# Patient Record
Sex: FEMALE | Race: WHITE | NOT HISPANIC OR LATINO | URBAN - METROPOLITAN AREA
[De-identification: names, ages, dates, MRNs, and addresses within clinical notes are randomized per-mention and may not be internally consistent; named-entity substitution may affect disease eponyms.]

---

## 2017-04-08 ENCOUNTER — OUTPATIENT (OUTPATIENT)
Dept: OUTPATIENT SERVICES | Facility: HOSPITAL | Age: 82
LOS: 1 days | Discharge: HOME | End: 2017-04-08

## 2017-06-27 DIAGNOSIS — D50.8 OTHER IRON DEFICIENCY ANEMIAS: ICD-10-CM

## 2017-06-27 DIAGNOSIS — R79.89 OTHER SPECIFIED ABNORMAL FINDINGS OF BLOOD CHEMISTRY: ICD-10-CM

## 2017-06-27 DIAGNOSIS — E55.9 VITAMIN D DEFICIENCY, UNSPECIFIED: ICD-10-CM

## 2017-06-30 ENCOUNTER — OUTPATIENT (OUTPATIENT)
Dept: OUTPATIENT SERVICES | Facility: HOSPITAL | Age: 82
LOS: 1 days | Discharge: HOME | End: 2017-06-30

## 2017-06-30 DIAGNOSIS — S72.143A DISPLACED INTERTROCHANTERIC FRACTURE OF UNSPECIFIED FEMUR, INITIAL ENCOUNTER FOR CLOSED FRACTURE: ICD-10-CM

## 2017-06-30 DIAGNOSIS — I10 ESSENTIAL (PRIMARY) HYPERTENSION: ICD-10-CM

## 2017-06-30 DIAGNOSIS — E78.5 HYPERLIPIDEMIA, UNSPECIFIED: ICD-10-CM

## 2017-10-24 ENCOUNTER — OUTPATIENT (OUTPATIENT)
Dept: OUTPATIENT SERVICES | Facility: HOSPITAL | Age: 82
LOS: 1 days | Discharge: HOME | End: 2017-10-24

## 2017-10-24 DIAGNOSIS — S72.143A DISPLACED INTERTROCHANTERIC FRACTURE OF UNSPECIFIED FEMUR, INITIAL ENCOUNTER FOR CLOSED FRACTURE: ICD-10-CM

## 2017-10-24 DIAGNOSIS — R79.89 OTHER SPECIFIED ABNORMAL FINDINGS OF BLOOD CHEMISTRY: ICD-10-CM

## 2017-11-01 DIAGNOSIS — E55.9 VITAMIN D DEFICIENCY, UNSPECIFIED: ICD-10-CM

## 2017-12-28 ENCOUNTER — OUTPATIENT (OUTPATIENT)
Dept: OUTPATIENT SERVICES | Facility: HOSPITAL | Age: 82
LOS: 1 days | Discharge: HOME | End: 2017-12-28

## 2017-12-28 DIAGNOSIS — M33.20 POLYMYOSITIS, ORGAN INVOLVEMENT UNSPECIFIED: ICD-10-CM

## 2017-12-28 DIAGNOSIS — D50.8 OTHER IRON DEFICIENCY ANEMIAS: ICD-10-CM

## 2017-12-28 DIAGNOSIS — M06.4 INFLAMMATORY POLYARTHROPATHY: ICD-10-CM

## 2017-12-28 DIAGNOSIS — R79.89 OTHER SPECIFIED ABNORMAL FINDINGS OF BLOOD CHEMISTRY: ICD-10-CM

## 2017-12-28 DIAGNOSIS — S72.143A DISPLACED INTERTROCHANTERIC FRACTURE OF UNSPECIFIED FEMUR, INITIAL ENCOUNTER FOR CLOSED FRACTURE: ICD-10-CM

## 2017-12-28 DIAGNOSIS — E55.9 VITAMIN D DEFICIENCY, UNSPECIFIED: ICD-10-CM

## 2018-03-05 ENCOUNTER — OUTPATIENT (OUTPATIENT)
Dept: OUTPATIENT SERVICES | Facility: HOSPITAL | Age: 83
LOS: 1 days | Discharge: HOME | End: 2018-03-05

## 2018-03-05 DIAGNOSIS — E03.9 HYPOTHYROIDISM, UNSPECIFIED: ICD-10-CM

## 2018-03-05 DIAGNOSIS — E55.9 VITAMIN D DEFICIENCY, UNSPECIFIED: ICD-10-CM

## 2018-03-05 DIAGNOSIS — E53.8 DEFICIENCY OF OTHER SPECIFIED B GROUP VITAMINS: ICD-10-CM

## 2018-03-05 DIAGNOSIS — E78.2 MIXED HYPERLIPIDEMIA: ICD-10-CM

## 2018-03-05 DIAGNOSIS — D50.9 IRON DEFICIENCY ANEMIA, UNSPECIFIED: ICD-10-CM

## 2018-04-17 ENCOUNTER — OUTPATIENT (OUTPATIENT)
Dept: OUTPATIENT SERVICES | Facility: HOSPITAL | Age: 83
LOS: 1 days | Discharge: HOME | End: 2018-04-17

## 2018-04-17 DIAGNOSIS — R79.89 OTHER SPECIFIED ABNORMAL FINDINGS OF BLOOD CHEMISTRY: ICD-10-CM

## 2018-04-17 DIAGNOSIS — R70.0 ELEVATED ERYTHROCYTE SEDIMENTATION RATE: ICD-10-CM

## 2018-04-17 DIAGNOSIS — M05.419: ICD-10-CM

## 2018-05-09 ENCOUNTER — OUTPATIENT (OUTPATIENT)
Dept: OUTPATIENT SERVICES | Facility: HOSPITAL | Age: 83
LOS: 1 days | Discharge: HOME | End: 2018-05-09

## 2018-05-09 DIAGNOSIS — E55.9 VITAMIN D DEFICIENCY, UNSPECIFIED: ICD-10-CM

## 2018-05-09 DIAGNOSIS — D50.8 OTHER IRON DEFICIENCY ANEMIAS: ICD-10-CM

## 2018-05-09 DIAGNOSIS — R79.89 OTHER SPECIFIED ABNORMAL FINDINGS OF BLOOD CHEMISTRY: ICD-10-CM

## 2018-05-09 DIAGNOSIS — M06.4 INFLAMMATORY POLYARTHROPATHY: ICD-10-CM

## 2018-06-07 ENCOUNTER — OUTPATIENT (OUTPATIENT)
Dept: OUTPATIENT SERVICES | Facility: HOSPITAL | Age: 83
LOS: 1 days | Discharge: HOME | End: 2018-06-07

## 2018-06-07 DIAGNOSIS — R26.81 UNSTEADINESS ON FEET: ICD-10-CM

## 2018-08-14 ENCOUNTER — OUTPATIENT (OUTPATIENT)
Dept: OUTPATIENT SERVICES | Facility: HOSPITAL | Age: 83
LOS: 1 days | Discharge: HOME | End: 2018-08-14

## 2018-08-15 DIAGNOSIS — Z13.820 ENCOUNTER FOR SCREENING FOR OSTEOPOROSIS: ICD-10-CM

## 2018-08-15 DIAGNOSIS — M89.9 DISORDER OF BONE, UNSPECIFIED: ICD-10-CM

## 2018-08-15 DIAGNOSIS — Z87.310 PERSONAL HISTORY OF (HEALED) OSTEOPOROSIS FRACTURE: ICD-10-CM

## 2018-08-15 DIAGNOSIS — Z78.0 ASYMPTOMATIC MENOPAUSAL STATE: ICD-10-CM

## 2018-10-19 ENCOUNTER — OUTPATIENT (OUTPATIENT)
Dept: OUTPATIENT SERVICES | Facility: HOSPITAL | Age: 83
LOS: 1 days | Discharge: HOME | End: 2018-10-19

## 2018-10-19 DIAGNOSIS — M06.4 INFLAMMATORY POLYARTHROPATHY: ICD-10-CM

## 2018-10-19 DIAGNOSIS — D50.8 OTHER IRON DEFICIENCY ANEMIAS: ICD-10-CM

## 2018-10-19 DIAGNOSIS — E55.9 VITAMIN D DEFICIENCY, UNSPECIFIED: ICD-10-CM

## 2018-10-19 DIAGNOSIS — R79.89 OTHER SPECIFIED ABNORMAL FINDINGS OF BLOOD CHEMISTRY: ICD-10-CM

## 2019-02-09 ENCOUNTER — APPOINTMENT (OUTPATIENT)
Dept: GERIATRICS | Facility: HOME HEALTH | Age: 84
End: 2019-02-09

## 2019-02-09 DIAGNOSIS — Z91.81 HISTORY OF FALLING: ICD-10-CM

## 2019-02-09 PROBLEM — Z00.00 ENCOUNTER FOR PREVENTIVE HEALTH EXAMINATION: Status: ACTIVE | Noted: 2019-02-09

## 2019-02-09 NOTE — PHYSICAL EXAM
[General Appearance - Alert] : alert [General Appearance - In No Acute Distress] : in no acute distress [Sclera] : the sclera and conjunctiva were normal [PERRL With Normal Accommodation] : pupils were equal in size, round, and reactive to light [Extraocular Movements] : extraocular movements were intact [Outer Ear] : the ears and nose were normal in appearance [Oropharynx] : the oropharynx was normal [Neck Appearance] : the appearance of the neck was normal [Neck Cervical Mass (___cm)] : no neck mass was observed [Jugular Venous Distention Increased] : there was no jugular-venous distention [Thyroid Diffuse Enlargement] : the thyroid was not enlarged [Thyroid Nodule] : there were no palpable thyroid nodules [Auscultation Breath Sounds / Voice Sounds] : lungs were clear to auscultation bilaterally [Heart Rate And Rhythm] : heart rate was normal and rhythm regular [Heart Sounds] : normal S1 and S2 [Heart Sounds Gallop] : no gallops [Murmurs] : no murmurs [Heart Sounds Pericardial Friction Rub] : no pericardial rub [Full Pulse] : the pedal pulses are present [Edema] : there was no peripheral edema [Breast Appearance] : normal in appearance [Breast Palpation Mass] : no palpable masses [Bowel Sounds] : normal bowel sounds [Abdomen Soft] : soft [Abdomen Tenderness] : non-tender [Abdomen Mass (___ Cm)] : no abdominal mass palpated [Cervical Lymph Nodes Enlarged Posterior Bilaterally] : posterior cervical [Cervical Lymph Nodes Enlarged Anterior Bilaterally] : anterior cervical [Supraclavicular Lymph Nodes Enlarged Bilaterally] : supraclavicular [Axillary Lymph Nodes Enlarged Bilaterally] : axillary [Femoral Lymph Nodes Enlarged Bilaterally] : femoral [Inguinal Lymph Nodes Enlarged Bilaterally] : inguinal [No CVA Tenderness] : no ~M costovertebral angle tenderness [No Spinal Tenderness] : no spinal tenderness [Ataxic] : ataxic [Skin Color & Pigmentation] : normal skin color and pigmentation [Skin Turgor] : normal skin turgor [] : no rash [Deep Tendon Reflexes (DTR)] : deep tendon reflexes were 2+ and symmetric [Sensation] : the sensory exam was normal to light touch and pinprick [No Focal Deficits] : no focal deficits

## 2019-02-09 NOTE — HISTORY OF PRESENT ILLNESS
[FreeTextEntry1] : pt has recovery of her swollen ankles. C/O constipation at times [0] : 2) Feeling down, depressed, or hopeless: Not at all

## 2019-08-23 ENCOUNTER — APPOINTMENT (OUTPATIENT)
Dept: GERIATRICS | Facility: HOME HEALTH | Age: 84
End: 2019-08-23
Payer: MEDICARE

## 2019-08-23 PROCEDURE — 99348 HOME/RES VST EST LOW MDM 30: CPT

## 2019-08-28 VITALS
HEART RATE: 60 BPM | TEMPERATURE: 98.2 F | OXYGEN SATURATION: 98 % | RESPIRATION RATE: 18 BRPM | SYSTOLIC BLOOD PRESSURE: 128 MMHG | DIASTOLIC BLOOD PRESSURE: 82 MMHG

## 2019-08-28 RX ORDER — METOPROLOL TARTRATE 50 MG/1
50 TABLET, FILM COATED ORAL
Refills: 0 | Status: ACTIVE | COMMUNITY

## 2019-08-28 NOTE — PHYSICAL EXAM
[General Appearance - Alert] : alert [General Appearance - In No Acute Distress] : in no acute distress [General Appearance - Well Nourished] : well nourished [General Appearance - Well Developed] : well developed [Sclera] : the sclera and conjunctiva were normal [Normal Oral Mucosa] : normal oral mucosa [No Oral Pallor] : no oral pallor [No Oral Cyanosis] : no oral cyanosis [Outer Ear] : the ears and nose were normal in appearance [Hearing Threshold Finger Rub Not [place]] : hearing was normal [Examination Of The Oral Cavity] : the lips and gums were normal [Neck Cervical Mass (___cm)] : no neck mass was observed [Jugular Venous Distention Increased] : there was no jugular-venous distention [Respiration, Rhythm And Depth] : normal respiratory rhythm and effort [Exaggerated Use Of Accessory Muscles For Inspiration] : no accessory muscle use [Auscultation Breath Sounds / Voice Sounds] : lungs were clear to auscultation bilaterally [Heart Rate And Rhythm] : heart rate was normal and rhythm regular [Heart Sounds] : normal S1 and S2 [Edema] : there was no peripheral edema [Abdomen Tenderness] : non-tender [Abdomen Soft] : soft [Nail Clubbing] : no clubbing  or cyanosis of the fingernails [No Spinal Tenderness] : no spinal tenderness [Involuntary Movements] : no involuntary movements were seen [] : no rash [Skin Color & Pigmentation] : normal skin color and pigmentation [Cranial Nerves] : cranial nerves 2-12 were intact [No Focal Deficits] : no focal deficits [Impaired Insight] : insight and judgment were intact [Oriented To Time, Place, And Person] : oriented to person, place, and time

## 2019-08-28 NOTE — HISTORY OF PRESENT ILLNESS
[FreeTextEntry1] : Patient seen and examined at home.  Patient is homebound.  Patient's friend Santy present during visit.  Overall patient doing well.  no complaints of CP/SOB.  No abdominal complaints with good appetite and regular BM's.  No urianry complaints.  Patient with Hx of RA, following with Rheum, Dr. Castano, not currently on any medications.  Was going to outpatient PT but has not gone in several months.  No recent falls.  \par \par - Patient lives alone but her friend spends a lot of time with her and sleeps over most nights.  no HHA.  - Family involved and checks on patient frequently\par - Patient uses a walker for ambulation

## 2019-08-28 NOTE — ASSESSMENT
[FreeTextEntry1] : HTN\par - b/p acceptable at visit\par - c/w current medications\par - check routine labs\par - low Na+ diet\par \par RA/Gait instability\par - DME for ambulation\par - Outpatient FU with Rheumatology \par - Not currently on any medications, using APAP PRN for pain\par - PT/OT evaluation\par - check routine labs\par \par Vitamin D Deficiency\par - c/w PO Supplement \par - check levels\par \par Overall patient doing well.  Has adequate social support and services in place.  Will perform plan as outlined above.  Advised to call w/changes in status.

## 2019-08-29 ENCOUNTER — OUTPATIENT (OUTPATIENT)
Dept: OUTPATIENT SERVICES | Facility: HOSPITAL | Age: 84
LOS: 1 days | Discharge: HOME | End: 2019-08-29

## 2019-08-29 DIAGNOSIS — I10 ESSENTIAL (PRIMARY) HYPERTENSION: ICD-10-CM

## 2019-08-31 ENCOUNTER — RESULT REVIEW (OUTPATIENT)
Age: 84
End: 2019-08-31

## 2019-08-31 LAB
25(OH)D3 SERPL-MCNC: 78 NG/ML
ALBUMIN SERPL ELPH-MCNC: 4.1 G/DL
ALP BLD-CCNC: 56 U/L
ALT SERPL-CCNC: 10 U/L
ANION GAP SERPL CALC-SCNC: 13 MMOL/L
AST SERPL-CCNC: 16 U/L
BASOPHILS # BLD AUTO: 0.02 K/UL
BASOPHILS NFR BLD AUTO: 0.4 %
BILIRUB SERPL-MCNC: 0.5 MG/DL
BUN SERPL-MCNC: 17 MG/DL
CALCIUM SERPL-MCNC: 9.5 MG/DL
CHLORIDE SERPL-SCNC: 102 MMOL/L
CHOLEST SERPL-MCNC: 178 MG/DL
CHOLEST/HDLC SERPL: 4.3 RATIO
CO2 SERPL-SCNC: 28 MMOL/L
CREAT SERPL-MCNC: 0.7 MG/DL
EOSINOPHIL # BLD AUTO: 0.11 K/UL
EOSINOPHIL NFR BLD AUTO: 2.3 %
ESTIMATED AVERAGE GLUCOSE: 97 MG/DL
FOLATE SERPL-MCNC: 12.1 NG/ML
GLUCOSE SERPL-MCNC: 80 MG/DL
HBA1C MFR BLD HPLC: 5 %
HCT VFR BLD CALC: 37.1 %
HDLC SERPL-MCNC: 41 MG/DL
HGB BLD-MCNC: 12.3 G/DL
IMM GRANULOCYTES NFR BLD AUTO: 0.2 %
LDLC SERPL CALC-MCNC: 129 MG/DL
LYMPHOCYTES # BLD AUTO: 1.92 K/UL
LYMPHOCYTES NFR BLD AUTO: 39.7 %
MAN DIFF?: NORMAL
MCHC RBC-ENTMCNC: 30.8 PG
MCHC RBC-ENTMCNC: 33.2 G/DL
MCV RBC AUTO: 93 FL
MONOCYTES # BLD AUTO: 0.37 K/UL
MONOCYTES NFR BLD AUTO: 7.6 %
NEUTROPHILS # BLD AUTO: 2.41 K/UL
NEUTROPHILS NFR BLD AUTO: 49.8 %
PLATELET # BLD AUTO: 154 K/UL
POTASSIUM SERPL-SCNC: 3.5 MMOL/L
PROT SERPL-MCNC: 6.9 G/DL
RBC # BLD: 3.99 M/UL
RBC # FLD: 12.6 %
SODIUM SERPL-SCNC: 143 MMOL/L
T3FREE SERPL-MCNC: 2.65 PG/ML
T4 FREE SERPL-MCNC: 0.9 NG/DL
TRIGL SERPL-MCNC: 144 MG/DL
TSH SERPL-ACNC: 5.11 UIU/ML
VIT B12 SERPL-MCNC: 542 PG/ML
WBC # FLD AUTO: 4.84 K/UL

## 2019-09-26 ENCOUNTER — OUTPATIENT (OUTPATIENT)
Dept: OUTPATIENT SERVICES | Facility: HOSPITAL | Age: 84
LOS: 1 days | Discharge: HOME | End: 2019-09-26

## 2019-09-26 DIAGNOSIS — E78.5 HYPERLIPIDEMIA, UNSPECIFIED: ICD-10-CM

## 2019-09-26 DIAGNOSIS — I10 ESSENTIAL (PRIMARY) HYPERTENSION: ICD-10-CM

## 2019-10-11 ENCOUNTER — LABORATORY RESULT (OUTPATIENT)
Age: 84
End: 2019-10-11

## 2019-10-11 ENCOUNTER — OUTPATIENT (OUTPATIENT)
Dept: OUTPATIENT SERVICES | Facility: HOSPITAL | Age: 84
LOS: 1 days | Discharge: HOME | End: 2019-10-11

## 2019-10-11 DIAGNOSIS — R79.89 OTHER SPECIFIED ABNORMAL FINDINGS OF BLOOD CHEMISTRY: ICD-10-CM

## 2019-10-11 DIAGNOSIS — N39.0 URINARY TRACT INFECTION, SITE NOT SPECIFIED: ICD-10-CM

## 2019-10-14 ENCOUNTER — APPOINTMENT (OUTPATIENT)
Dept: GERIATRICS | Facility: HOME HEALTH | Age: 84
End: 2019-10-14
Payer: MEDICARE

## 2019-10-14 PROCEDURE — 99348 HOME/RES VST EST LOW MDM 30: CPT

## 2019-10-15 NOTE — REVIEW OF SYSTEMS
[Chills] : chills [Feeling Poorly] : feeling poorly [Abdominal Pain] : abdominal pain [Vomiting] : vomiting [Heartburn] : heartburn [Incontinence] : incontinence [Arthralgias] : arthralgias [Joint Pain] : joint pain [Joint Stiffness] : joint stiffness [Anxiety] : anxiety [Negative] : Respiratory

## 2019-10-15 NOTE — HISTORY OF PRESENT ILLNESS
[FreeTextEntry1] : Pt is  homebound due to gait instability. Pt states to have nausea w throwing up the clear vomitus of food contents,wout any blood or mucus. Also  c/o upper abdominal discomfort ,Denies F/Chills/Diarrhea /constipation. H/O sick contact is positive, Also c/o frequent ,dark yellowish urine jonh during night

## 2019-10-15 NOTE — PHYSICAL EXAM
[Sclera] : the sclera and conjunctiva were normal [PERRL With Normal Accommodation] : pupils were equal in size, round, and reactive to light [Extraocular Movements] : extraocular movements were intact [Outer Ear] : the ears and nose were normal in appearance [Oropharynx] : the oropharynx was normal [Auscultation Breath Sounds / Voice Sounds] : lungs were clear to auscultation bilaterally [Heart Rate And Rhythm] : heart rate was normal and rhythm regular [Heart Sounds] : normal S1 and S2 [Heart Sounds Gallop] : no gallops [Murmurs] : no murmurs [Heart Sounds Pericardial Friction Rub] : no pericardial rub [] : no hepato-splenomegaly [Abdomen Mass (___ Cm)] : no abdominal mass palpated [Abdomen Hernia] : no hernia was discovered [FreeTextEntry1] : dull mild tenderness in epigastrium, tympanic BS [No Focal Deficits] : no focal deficits [Oriented To Time, Place, And Person] : oriented to person, place, and time

## 2019-11-18 ENCOUNTER — APPOINTMENT (OUTPATIENT)
Dept: GERIATRICS | Facility: HOME HEALTH | Age: 84
End: 2019-11-18
Payer: MEDICARE

## 2019-11-18 PROCEDURE — 99348 HOME/RES VST EST LOW MDM 30: CPT

## 2019-11-26 NOTE — PHYSICAL EXAM
[FreeTextEntry1] : pt is d concerned for her US results [Sclera] : the sclera and conjunctiva were normal [Outer Ear] : the ears and nose were normal in appearance [PERRL With Normal Accommodation] : pupils were equal in size, round, and reactive to light [Extraocular Movements] : extraocular movements were intact [Oropharynx] : the oropharynx was normal [Auscultation Breath Sounds / Voice Sounds] : lungs were clear to auscultation bilaterally [Heart Sounds] : normal S1 and S2 [Heart Rate And Rhythm] : heart rate was normal and rhythm regular [Murmurs] : no murmurs [Heart Sounds Gallop] : no gallops [Heart Sounds Pericardial Friction Rub] : no pericardial rub [Bowel Sounds] : normal bowel sounds [Abdomen Soft] : soft [Abdomen Mass (___ Cm)] : no abdominal mass palpated [Abdomen Hernia] : no hernia was discovered [RLQ] : in the right lower quadrant [Suprapubic] : in the suprapubic area [No Spinal Tenderness] : no spinal tenderness [No CVA Tenderness] : no ~M costovertebral angle tenderness [Ataxic] : ataxic [Skin Color & Pigmentation] : normal skin color and pigmentation [No Focal Deficits] : no focal deficits [] : no rash [Skin Turgor] : normal skin turgor [Oriented To Time, Place, And Person] : oriented to person, place, and time

## 2019-11-26 NOTE — HISTORY OF PRESENT ILLNESS
[FreeTextEntry1] : Pt is homebound due to OA and gait instabilty. pt called for having pain in abdomen and constipation x -2 wks, Denies any h/o F/ V/Nausea/Wt loss. Has h/o intyermiyttent. dull, ache about 2-3 in severity . also has h/o frequent urination jonh at night wout any foul smelling or dark colored urine. US of abd/ pelvis shows a small stone in R ureter and pt has PMH of renal stones wout any serious sequele

## 2019-11-26 NOTE — REVIEW OF SYSTEMS
[Feeling Poorly] : feeling poorly [Incontinence] : incontinence [Abdominal Pain] : abdominal pain [Arthralgias] : arthralgias [Joint Pain] : joint pain [Joint Stiffness] : joint stiffness [Negative] : Integumentary [FreeTextEntry8] : frequency jonh nocturia

## 2020-02-08 ENCOUNTER — APPOINTMENT (OUTPATIENT)
Dept: GERIATRICS | Facility: HOME HEALTH | Age: 85
End: 2020-02-08
Payer: MEDICARE

## 2020-02-08 VITALS
DIASTOLIC BLOOD PRESSURE: 84 MMHG | SYSTOLIC BLOOD PRESSURE: 132 MMHG | RESPIRATION RATE: 16 BRPM | TEMPERATURE: 97.8 F | HEART RATE: 61 BPM | OXYGEN SATURATION: 98 %

## 2020-02-08 PROCEDURE — 99348 HOME/RES VST EST LOW MDM 30: CPT

## 2020-02-08 NOTE — PHYSICAL EXAM
[General Appearance - Well Developed] : well developed [General Appearance - Well Nourished] : well nourished [General Appearance - Alert] : alert [General Appearance - In No Acute Distress] : in no acute distress [Normal Oral Mucosa] : normal oral mucosa [Sclera] : the sclera and conjunctiva were normal [No Oral Cyanosis] : no oral cyanosis [Hearing Threshold Finger Rub Not [place]] : hearing was normal [No Oral Pallor] : no oral pallor [Outer Ear] : the ears and nose were normal in appearance [Jugular Venous Distention Increased] : there was no jugular-venous distention [Examination Of The Oral Cavity] : the lips and gums were normal [Neck Cervical Mass (___cm)] : no neck mass was observed [Respiration, Rhythm And Depth] : normal respiratory rhythm and effort [Exaggerated Use Of Accessory Muscles For Inspiration] : no accessory muscle use [Auscultation Breath Sounds / Voice Sounds] : lungs were clear to auscultation bilaterally [Heart Rate And Rhythm] : heart rate was normal and rhythm regular [Heart Sounds] : normal S1 and S2 [Abdomen Tenderness] : non-tender [Abdomen Soft] : soft [Edema] : there was no peripheral edema [No Spinal Tenderness] : no spinal tenderness [Nail Clubbing] : no clubbing  or cyanosis of the fingernails [Involuntary Movements] : no involuntary movements were seen [] : no rash [Skin Lesions] : no skin lesions [Cranial Nerves] : cranial nerves 2-12 were intact [No Focal Deficits] : no focal deficits [Oriented To Time, Place, And Person] : oriented to person, place, and time [Impaired Insight] : insight and judgment were intact

## 2020-02-08 NOTE — ASSESSMENT
[FreeTextEntry1] : HTN\par - b/p acceptable at visit\par - c/w current medications\par - low Na+ diet\par - following outpt w/Cards (Dr. Montague) Went several weeks ago.  No change to RX\par \par RA/Gait instability\par - DME for ambulation\par - Outpatient FU with Rheumatology  as scheduled (Dr. Sadler)\par - Not currently on any medications, using APAP PRN for pain\par - PT/OT evaluation\par \par Constipation\par - Colace/Senna PRN\par - Miralax QD PRN\par \par Vitamin D Deficiency\par - c/w PO Supplement \par - check levels\par

## 2020-02-08 NOTE — HISTORY OF PRESENT ILLNESS
[FreeTextEntry1] : Patient seen and examined at home.  Patient is homebound 2/2 generalized weakness/debility.  Patient reports doing well.  No acute complaints.  No CP/SOB. No abdominal complaints with good appetite and regular BM's.  No urinary complaints.  No skin wounds.  No recent falls.  Medications reviewed and reconciled.  Patient did report some constipation for which she went to [facility] and given Miralax with good results.  I explained to her that she should call us if she develops acute complaints and I left a business card w/phone number.

## 2020-05-30 ENCOUNTER — APPOINTMENT (OUTPATIENT)
Dept: GERIATRICS | Facility: HOME HEALTH | Age: 85
End: 2020-05-30
Payer: MEDICARE

## 2020-05-30 VITALS
RESPIRATION RATE: 18 BRPM | TEMPERATURE: 98.2 F | DIASTOLIC BLOOD PRESSURE: 82 MMHG | SYSTOLIC BLOOD PRESSURE: 128 MMHG | HEART RATE: 58 BPM | OXYGEN SATURATION: 97 %

## 2020-05-30 DIAGNOSIS — N39.41 URGE INCONTINENCE: ICD-10-CM

## 2020-05-30 PROCEDURE — 99348 HOME/RES VST EST LOW MDM 30: CPT

## 2020-05-30 NOTE — ASSESSMENT
[FreeTextEntry1] : HTN\par - b/p acceptable at visit\par - c/w current medications\par - low Na+ diet\par - following outpt w/Cards (Dr. Montague), recent appt cancelled 2/2 COVID, will reschedule when able\par \par RA/Gait instability\par - DME for ambulation, using cane/walker.  no recent falls\par - Outpatient FU with Rheumatology as scheduled (Dr. Sadler)\par - Not currently on any medications, using APAP PRN for pain\par - PT/OT evaluation again, last time patient reports only received two visits and was cancelled 2/2 COVID.  \par \par Urge Incontinence\par - will try Oxybutynin 5mg PO BID to help with nocturia\par - advised to call in 2 weeks with progress report \par \par Constipation\par - Colace/Senna PRN\par - MiraLAX QD PRN\par \par Vitamin D Deficiency\par - c/w PO Supplement \par \par

## 2020-05-30 NOTE — HISTORY OF PRESENT ILLNESS
[FreeTextEntry1] : Patient seen and examined at home.  Patient is homebound 2/2 generalized weakness/debility.  Friend present during visit.  All report patient doing well.  No acute complaints.  No CP/SOB. No abdominal complaints with good appetite and regular BM's.  No urinary complaints.  Patient with complaints of decreasing ambulation status, no recent falls.  Pain at baseline.  Patient reporting frequent urination, especially at night.  No dysuria/hematuria.  No flank pain.  No fever.  States has had this problem for several years,

## 2020-05-30 NOTE — PHYSICAL EXAM
[General Appearance - Alert] : alert [General Appearance - In No Acute Distress] : in no acute distress [General Appearance - Well Developed] : well developed [General Appearance - Well Nourished] : well nourished [Sclera] : the sclera and conjunctiva were normal [Normal Oral Mucosa] : normal oral mucosa [No Oral Pallor] : no oral pallor [No Oral Cyanosis] : no oral cyanosis [Outer Ear] : the ears and nose were normal in appearance [Hearing Threshold Finger Rub Not [place]] : hearing was normal [Examination Of The Oral Cavity] : the lips and gums were normal [Jugular Venous Distention Increased] : there was no jugular-venous distention [Neck Cervical Mass (___cm)] : no neck mass was observed [Exaggerated Use Of Accessory Muscles For Inspiration] : no accessory muscle use [Respiration, Rhythm And Depth] : normal respiratory rhythm and effort [Auscultation Breath Sounds / Voice Sounds] : lungs were clear to auscultation bilaterally [Heart Sounds] : normal S1 and S2 [Heart Rate And Rhythm] : heart rate was normal and rhythm regular [Edema] : there was no peripheral edema [Abdomen Soft] : soft [Abdomen Tenderness] : non-tender [No Spinal Tenderness] : no spinal tenderness [Nail Clubbing] : no clubbing  or cyanosis of the fingernails [Involuntary Movements] : no involuntary movements were seen [Skin Lesions] : no skin lesions [] : no rash [Cranial Nerves] : cranial nerves 2-12 were intact [No Focal Deficits] : no focal deficits [Oriented To Time, Place, And Person] : oriented to person, place, and time [Impaired Insight] : insight and judgment were intact

## 2020-08-26 ENCOUNTER — EMERGENCY (EMERGENCY)
Facility: HOSPITAL | Age: 85
LOS: 0 days | Discharge: HOME | End: 2020-08-26
Attending: EMERGENCY MEDICINE | Admitting: EMERGENCY MEDICINE
Payer: MEDICARE

## 2020-08-26 VITALS
HEART RATE: 64 BPM | SYSTOLIC BLOOD PRESSURE: 192 MMHG | DIASTOLIC BLOOD PRESSURE: 107 MMHG | RESPIRATION RATE: 18 BRPM | TEMPERATURE: 99 F | OXYGEN SATURATION: 98 %

## 2020-08-26 VITALS
DIASTOLIC BLOOD PRESSURE: 88 MMHG | OXYGEN SATURATION: 98 % | HEART RATE: 62 BPM | RESPIRATION RATE: 18 BRPM | SYSTOLIC BLOOD PRESSURE: 186 MMHG

## 2020-08-26 DIAGNOSIS — I10 ESSENTIAL (PRIMARY) HYPERTENSION: ICD-10-CM

## 2020-08-26 DIAGNOSIS — K62.89 OTHER SPECIFIED DISEASES OF ANUS AND RECTUM: ICD-10-CM

## 2020-08-26 DIAGNOSIS — K59.00 CONSTIPATION, UNSPECIFIED: ICD-10-CM

## 2020-08-26 PROCEDURE — 99283 EMERGENCY DEPT VISIT LOW MDM: CPT

## 2020-08-26 NOTE — ED ADULT NURSE NOTE - CHPI ED NUR SYMPTOMS NEG
no vomiting/no dysuria/no abdominal distension/no blood in stool/no burning urination/no chills/no diarrhea/no fever/no hematuria/no nausea

## 2020-08-26 NOTE — ED PROVIDER NOTE - NSFOLLOWUPINSTRUCTIONS_ED_ALL_ED_FT
Constipation, Adult  Constipation is when a person has fewer bowel movements in a week than normal, has difficulty having a bowel movement, or has stools that are dry, hard, or larger than normal. Constipation may be caused by an underlying condition. It may become worse with age if a person takes certain medicines and does not take in enough fluids.  Follow these instructions at home:  Eating and drinking        Eat foods that have a lot of fiber, such as fresh fruits and vegetables, whole grains, and beans.Limit foods that are high in fat, low in fiber, or overly processed, such as french fries, hamburgers, cookies, candies, and soda.Drink enough fluid to keep your urine clear or pale yellow.General instructions     Exercise regularly or as told by your health care provider.Go to the restroom when you have the urge to go. Do not hold it in.Take over-the-counter and prescription medicines only as told by your health care provider. These include any fiber supplements.Practice pelvic floor retraining exercises, such as deep breathing while relaxing the lower abdomen and pelvic floor relaxation during bowel movements.Watch your condition for any changes.Keep all follow-up visits as told by your health care provider. This is important.Contact a health care provider if:  You have pain that gets worse.You have a fever.You do not have a bowel movement after 4 days.You vomit.You are not hungry.You lose weight.You are bleeding from the anus.You have thin, pencil-like stools.Get help right away if:  You have a fever and your symptoms suddenly get worse.You leak stool or have blood in your stool.Your abdomen is bloated.You have severe pain in your abdomen.You feel dizzy or you faint.This information is not intended to replace advice given to you by your health care provider. Make sure you discuss any questions you have with your health care provider.

## 2020-08-26 NOTE — ED PROVIDER NOTE - CARE PROVIDER_API CALL
Phylicia Nettles  GASTROENTEROLOGY  [address] [place] [address]  Phone: [phone]  Fax: [phone]  Follow Up Time: 1-3 Days

## 2020-08-26 NOTE — ED PROVIDER NOTE - NS ED ROS FT
Review of Systems:  	•	CONSTITUTIONAL - no fever, no diaphoresis, no chills  	•	SKIN - no rash  	•	HEMATOLOGIC - no bleeding, no bruising  	•	EYES - no eye pain, no blurry vision  	•	ENT - no congestion  	•	RESPIRATORY - no shortness of breath, no cough  	•	CARDIAC - no chest pain, no palpitations  	•	GI - no abd pain, no nausea, no vomiting, no diarrhea, + constipation  	•	GENITO-URINARY - no dysuria; no hematuria, no increased urinary frequency  	•	MUSCULOSKELETAL - no joint paint, no swelling, no redness  	•	NEUROLOGIC - no weakness, no headache, no paresthesias, no LOC  	•	PSYCH - no anxiety, no depression  	All other ROS are negative except as documented in HPI.

## 2020-08-26 NOTE — ED PROVIDER NOTE - PROGRESS NOTE DETAILS
Attending Note: 89 y/o F p/w constipation, x3 days of small bowel movements and increased rectal pressure when going to the bathroom. Pt’s son noticed some kind of protruding mass when he examined and brought pt to ED. No vomiting. No ABD pain. No fever. On exam: PT in NAD. Cardiac- S1S2. Lungs- CTAB. Abdomen soft NTND, soft stool in rectal vault, no protruding mass, no rectal prolapse. Extremities- No LE edema. Neuro- grossly intact. Plan: stool impaction/ constipation. Will disimpact.

## 2020-08-26 NOTE — ED ADULT NURSE NOTE - INTERVENTIONS DEFINITIONS
Monitor for mental status changes and reorient to person, place, and time/Physically safe environment: no spills, clutter or unnecessary equipment/Instruct patient to call for assistance/Stretcher in lowest position, wheels locked, appropriate side rails in place/Monitor gait and stability/[place] to call system/Reinforce activity limits and safety measures with patient and family

## 2020-08-26 NOTE — ED PROVIDER NOTE - ATTENDING CONTRIBUTION TO CARE
Attending Note: 89 y/o F p/w constipation, x3 days of small bowel movements and increased rectal pressure when going to the bathroom. Pt’s son noticed some kind of protruding mass when he examined and brought pt to ED. No vomiting. No ABD pain. No fever. On exam: PT in NAD. Cardiac- S1S2. Lungs- CTAB. Abdomen soft NTND, soft stool in rectal vault, no protruding mass, no rectal prolapse. Extremities- No LE edema. Neuro- grossly intact. Plan: stool impaction/ constipation. Will disimpact.

## 2020-08-26 NOTE — ED ADULT NURSE NOTE - OBJECTIVE STATEMENT
pt presents with rectal pain started yesterday after trying to have a bowel movement. pt with hx of chronic constipation, uses milk of magnesia but didn't help this time. last BM was 3 days ago. pt normally have a BM every other day. pt c/o abdominal pain, denies nausea, vomiting, or fever at home. pt denies bleeding in stool or urine

## 2020-08-26 NOTE — ED PROVIDER NOTE - PHYSICAL EXAMINATION
VITAL SIGNS: I have reviewed nursing notes and confirm.  CONSTITUTIONAL: Well-developed; well-nourished; in no acute distress.  SKIN: Skin exam is warm and dry, no acute rash.  HEAD: Normocephalic; atraumatic.  EYES: PERRL, EOM intact; conjunctiva and sclera clear.  ENT: No nasal discharge; airway clear.   NECK: Supple; non tender.  CARD: S1, S2 normal; no murmurs, gallops, or rubs. Regular rate and rhythm.  RESP: Clear to auscultation bilaterally. No wheezes, rales or rhonchi.  ABD: Normal bowel sounds; soft; non-distended; non-tender.   RECTAL: No hemorrhoids noted. +Soft stool in rectal vault. No rectal prolapse or protruding mass.  : : external genitalia WNL, without lesions. mucosa pink and moist. No uterine prolapse. Chaperoned by Rene.   EXT: Normal ROM. No edema.  LYMPH: No acute cervical adenopathy.  NEURO: Alert, oriented. Grossly unremarkable. No focal deficits.  PSYCH: Cooperative, appropriate.

## 2020-08-26 NOTE — ED PROVIDER NOTE - OBJECTIVE STATEMENT
89 yo F with pmhx of HTN presenting with constipation x 3 days associated with rectal pressure and concern for something protruding out of rectal area as per family. Symptoms are mild. No alleviating/aggravating factors. No cp, sob, fever, chills, abdominal pain, nausea, vomiting, diarrhea, back pain, urinary symptoms, headache, dizziness, paresthesias, or weakness.

## 2020-08-26 NOTE — ED PROVIDER NOTE - CLINICAL SUMMARY MEDICAL DECISION MAKING FREE TEXT BOX
evaluate for rectal impaction, stool removed after disimpaciton and patietn felt better, abd remained soft nt. symptoms resolved after procedure. patietn to fu with GI.

## 2020-08-26 NOTE — ED PROVIDER NOTE - PATIENT PORTAL LINK FT
You can access the FollowMyHealth Patient Portal offered by [facility] by registering at the following website: [email or web address]. By joining [facility]’s FollowMyHealth portal, you will also be able to view your health information using other applications (apps) compatible with our system.

## 2021-01-07 ENCOUNTER — INPATIENT (INPATIENT)
Facility: HOSPITAL | Age: 86
LOS: 4 days | Discharge: HOME HEALTH | End: 2021-01-12
Attending: INTERNAL MEDICINE | Admitting: INTERNAL MEDICINE
Payer: MEDICARE

## 2021-01-07 VITALS
TEMPERATURE: 98 F | SYSTOLIC BLOOD PRESSURE: 146 MMHG | WEIGHT: 125 LBS | RESPIRATION RATE: 18 BRPM | OXYGEN SATURATION: 97 % | DIASTOLIC BLOOD PRESSURE: 106 MMHG | HEART RATE: 88 BPM

## 2021-01-07 PROBLEM — I10 ESSENTIAL (PRIMARY) HYPERTENSION: Chronic | Status: ACTIVE | Noted: 2020-08-26

## 2021-01-07 LAB
ALBUMIN SERPL ELPH-MCNC: 3.4 G/DL — LOW (ref 3.5–5.2)
ALP SERPL-CCNC: 134 U/L — HIGH (ref 30–115)
ALT FLD-CCNC: 5 U/L — SIGNIFICANT CHANGE UP (ref 0–41)
ANION GAP SERPL CALC-SCNC: 9 MMOL/L — SIGNIFICANT CHANGE UP (ref 7–14)
APPEARANCE UR: CLEAR — SIGNIFICANT CHANGE UP
APTT BLD: 27.1 SEC — SIGNIFICANT CHANGE UP (ref 27–39.2)
AST SERPL-CCNC: 14 U/L — SIGNIFICANT CHANGE UP (ref 0–41)
BASOPHILS # BLD AUTO: 0.01 K/UL — SIGNIFICANT CHANGE UP (ref 0–0.2)
BASOPHILS NFR BLD AUTO: 0.1 % — SIGNIFICANT CHANGE UP (ref 0–1)
BILIRUB SERPL-MCNC: 0.8 MG/DL — SIGNIFICANT CHANGE UP (ref 0.2–1.2)
BILIRUB UR-MCNC: NEGATIVE — SIGNIFICANT CHANGE UP
BUN SERPL-MCNC: 24 MG/DL — HIGH (ref 10–20)
CALCIUM SERPL-MCNC: 9.3 MG/DL — SIGNIFICANT CHANGE UP (ref 8.5–10.1)
CHLORIDE SERPL-SCNC: 105 MMOL/L — SIGNIFICANT CHANGE UP (ref 98–110)
CO2 SERPL-SCNC: 25 MMOL/L — SIGNIFICANT CHANGE UP (ref 17–32)
COLOR SPEC: COLORLESS — SIGNIFICANT CHANGE UP
CREAT SERPL-MCNC: 1.1 MG/DL — SIGNIFICANT CHANGE UP (ref 0.7–1.5)
DIFF PNL FLD: NEGATIVE — SIGNIFICANT CHANGE UP
EOSINOPHIL # BLD AUTO: 0.06 K/UL — SIGNIFICANT CHANGE UP (ref 0–0.7)
EOSINOPHIL NFR BLD AUTO: 0.7 % — SIGNIFICANT CHANGE UP (ref 0–8)
GLUCOSE SERPL-MCNC: 92 MG/DL — SIGNIFICANT CHANGE UP (ref 70–99)
GLUCOSE UR QL: NEGATIVE — SIGNIFICANT CHANGE UP
HCT VFR BLD CALC: 35.7 % — LOW (ref 37–47)
HGB BLD-MCNC: 11.9 G/DL — LOW (ref 12–16)
IMM GRANULOCYTES NFR BLD AUTO: 0.2 % — SIGNIFICANT CHANGE UP (ref 0.1–0.3)
INR BLD: 1.08 RATIO — SIGNIFICANT CHANGE UP (ref 0.65–1.3)
KETONES UR-MCNC: NEGATIVE — SIGNIFICANT CHANGE UP
LEUKOCYTE ESTERASE UR-ACNC: NEGATIVE — SIGNIFICANT CHANGE UP
LYMPHOCYTES # BLD AUTO: 1.81 K/UL — SIGNIFICANT CHANGE UP (ref 1.2–3.4)
LYMPHOCYTES # BLD AUTO: 20.4 % — LOW (ref 20.5–51.1)
MCHC RBC-ENTMCNC: 30.8 PG — SIGNIFICANT CHANGE UP (ref 27–31)
MCHC RBC-ENTMCNC: 33.3 G/DL — SIGNIFICANT CHANGE UP (ref 32–37)
MCV RBC AUTO: 92.5 FL — SIGNIFICANT CHANGE UP (ref 81–99)
MONOCYTES # BLD AUTO: 0.3 K/UL — SIGNIFICANT CHANGE UP (ref 0.1–0.6)
MONOCYTES NFR BLD AUTO: 3.4 % — SIGNIFICANT CHANGE UP (ref 1.7–9.3)
NEUTROPHILS # BLD AUTO: 6.69 K/UL — HIGH (ref 1.4–6.5)
NEUTROPHILS NFR BLD AUTO: 75.2 % — SIGNIFICANT CHANGE UP (ref 42.2–75.2)
NITRITE UR-MCNC: NEGATIVE — SIGNIFICANT CHANGE UP
NRBC # BLD: 0 /100 WBCS — SIGNIFICANT CHANGE UP (ref 0–0)
PH UR: 6.5 — SIGNIFICANT CHANGE UP (ref 5–8)
PLATELET # BLD AUTO: 226 K/UL — SIGNIFICANT CHANGE UP (ref 130–400)
POTASSIUM SERPL-MCNC: 4.3 MMOL/L — SIGNIFICANT CHANGE UP (ref 3.5–5)
POTASSIUM SERPL-SCNC: 4.3 MMOL/L — SIGNIFICANT CHANGE UP (ref 3.5–5)
PROT SERPL-MCNC: 6.7 G/DL — SIGNIFICANT CHANGE UP (ref 6–8)
PROT UR-MCNC: NEGATIVE — SIGNIFICANT CHANGE UP
PROTHROM AB SERPL-ACNC: 12.4 SEC — SIGNIFICANT CHANGE UP (ref 9.95–12.87)
RAPID RVP RESULT: SIGNIFICANT CHANGE UP
RBC # BLD: 3.86 M/UL — LOW (ref 4.2–5.4)
RBC # FLD: 13.2 % — SIGNIFICANT CHANGE UP (ref 11.5–14.5)
SARS-COV-2 RNA SPEC QL NAA+PROBE: SIGNIFICANT CHANGE UP
SODIUM SERPL-SCNC: 139 MMOL/L — SIGNIFICANT CHANGE UP (ref 135–146)
SP GR SPEC: 1 — LOW (ref 1.01–1.03)
UROBILINOGEN FLD QL: SIGNIFICANT CHANGE UP
WBC # BLD: 8.89 K/UL — SIGNIFICANT CHANGE UP (ref 4.8–10.8)
WBC # FLD AUTO: 8.89 K/UL — SIGNIFICANT CHANGE UP (ref 4.8–10.8)

## 2021-01-07 PROCEDURE — 93010 ELECTROCARDIOGRAM REPORT: CPT | Mod: 77

## 2021-01-07 PROCEDURE — 99285 EMERGENCY DEPT VISIT HI MDM: CPT | Mod: GC

## 2021-01-07 PROCEDURE — 71045 X-RAY EXAM CHEST 1 VIEW: CPT | Mod: 26

## 2021-01-07 PROCEDURE — 93010 ELECTROCARDIOGRAM REPORT: CPT

## 2021-01-07 RX ORDER — OXYBUTYNIN CHLORIDE 5 MG
5 TABLET ORAL
Refills: 0 | Status: DISCONTINUED | OUTPATIENT
Start: 2021-01-07 | End: 2021-01-12

## 2021-01-07 RX ORDER — LISINOPRIL 2.5 MG/1
40 TABLET ORAL DAILY
Refills: 0 | Status: DISCONTINUED | OUTPATIENT
Start: 2021-01-07 | End: 2021-01-11

## 2021-01-07 RX ORDER — DIPHENHYDRAMINE HCL 50 MG
25 CAPSULE ORAL EVERY 8 HOURS
Refills: 0 | Status: DISCONTINUED | OUTPATIENT
Start: 2021-01-07 | End: 2021-01-08

## 2021-01-07 RX ORDER — ENOXAPARIN SODIUM 100 MG/ML
16 INJECTION SUBCUTANEOUS ONCE
Refills: 0 | Status: DISCONTINUED | OUTPATIENT
Start: 2021-01-07 | End: 2021-01-07

## 2021-01-07 RX ORDER — CHLORHEXIDINE GLUCONATE 213 G/1000ML
1 SOLUTION TOPICAL
Refills: 0 | Status: DISCONTINUED | OUTPATIENT
Start: 2021-01-07 | End: 2021-01-12

## 2021-01-07 RX ORDER — METOPROLOL TARTRATE 50 MG
50 TABLET ORAL
Refills: 0 | Status: DISCONTINUED | OUTPATIENT
Start: 2021-01-07 | End: 2021-01-08

## 2021-01-07 RX ORDER — METOPROLOL TARTRATE 50 MG
5 TABLET ORAL ONCE
Refills: 0 | Status: DISCONTINUED | OUTPATIENT
Start: 2021-01-07 | End: 2021-01-07

## 2021-01-07 RX ORDER — ENOXAPARIN SODIUM 100 MG/ML
15 INJECTION SUBCUTANEOUS ONCE
Refills: 0 | Status: COMPLETED | OUTPATIENT
Start: 2021-01-07 | End: 2021-01-07

## 2021-01-07 RX ORDER — SODIUM CHLORIDE 9 MG/ML
1000 INJECTION, SOLUTION INTRAVENOUS ONCE
Refills: 0 | Status: COMPLETED | OUTPATIENT
Start: 2021-01-07 | End: 2021-01-07

## 2021-01-07 RX ORDER — OXYBUTYNIN CHLORIDE 5 MG
1 TABLET ORAL
Qty: 0 | Refills: 0 | DISCHARGE

## 2021-01-07 RX ORDER — ENOXAPARIN SODIUM 100 MG/ML
40 INJECTION SUBCUTANEOUS ONCE
Refills: 0 | Status: COMPLETED | OUTPATIENT
Start: 2021-01-07 | End: 2021-01-07

## 2021-01-07 RX ORDER — ENOXAPARIN SODIUM 100 MG/ML
56 INJECTION SUBCUTANEOUS EVERY 12 HOURS
Refills: 0 | Status: DISCONTINUED | OUTPATIENT
Start: 2021-01-08 | End: 2021-01-08

## 2021-01-07 RX ORDER — DEXAMETHASONE 0.5 MG/5ML
6 ELIXIR ORAL ONCE
Refills: 0 | Status: COMPLETED | OUTPATIENT
Start: 2021-01-07 | End: 2021-01-07

## 2021-01-07 RX ADMIN — Medication 6 MILLIGRAM(S): at 12:34

## 2021-01-07 RX ADMIN — SODIUM CHLORIDE 1000 MILLILITER(S): 9 INJECTION, SOLUTION INTRAVENOUS at 12:33

## 2021-01-07 RX ADMIN — Medication 50 MILLIGRAM(S): at 19:00

## 2021-01-07 RX ADMIN — SODIUM CHLORIDE 1000 MILLILITER(S): 9 INJECTION, SOLUTION INTRAVENOUS at 13:30

## 2021-01-07 RX ADMIN — ENOXAPARIN SODIUM 40 MILLIGRAM(S): 100 INJECTION SUBCUTANEOUS at 16:43

## 2021-01-07 NOTE — ED PROVIDER NOTE - UNABLE TO OBTAIN
I am unable to obtain a comprehensive history, review of systems, past medical history due to constraints imposed by the urgency of the patient's clinical condition and/or mental status. Dementia

## 2021-01-07 NOTE — ED PROVIDER NOTE - ATTENDING CONTRIBUTION TO CARE
89 y/o female h/o HTN, dementia, denies PSH, lives at home with friend who is also in 90s, no other assistance, now presents with (1) unable to care for self at home and (2) pruritic rash to body x months, worse over approx 1 week, no other household members with similar complaints, denies new medications, environmental exposures, recent travel, denies other complaints at present. Above confirmed with son who lives in [place].    Old chart reviewed.  I have reviewed and agree with the nursing note, except as documented in my note.    VSS, awake, alert, non-toxic appearing, lying comfortably in stretcher, in NAD, ears clear, oropharynx clear, mmm, no JVD or bruit, maculopapular rash on trunk and extermities, chest CTAB, non-labored breathing, no w/r/r, +S1/S2, RRR, no m/r/g, abdomen soft, NT, ND, +BS, no peripheral edema or deformities, alert, clear speech. 89 y/o female h/o HTN, dementia, denies PSH, lives at home with friend who is also in 90s, no other assistance, now presents with (1) unable to care for self at home and (2) pruritic rash to body x months, worse over approx 1 week, no other household members with similar complaints, denies new medications, environmental exposures, recent travel, denies other complaints at present. Above confirmed with son who lives in [place].    Old chart reviewed.  I have reviewed and agree with the nursing note, except as documented in my note.    VSS, awake, alert, non-toxic appearing, lying comfortably in stretcher, in NAD, ears clear, oropharynx clear, mmm, no JVD or bruit, maculopapular rash on trunk and extremities, chest CTAB, non-labored breathing, no w/r/r, +S1/S2, irreg irreg rhythm, no m/r/g, abdomen soft, NT, ND, +BS, no peripheral edema or deformities, alert, clear speech.

## 2021-01-07 NOTE — PROVIDER CONTACT NOTE (OTHER) - BACKGROUND
tele order, this is not a tele unit.
happened pt stated she fell a few weeks ago. RN did not receive any report form ED of previous fall. when RN turned pt for skin assessment there was also a bump on left hip. which was not noted to

## 2021-01-07 NOTE — H&P ADULT - ATTENDING COMMENTS
**HX and physical limited due to pt being poor historian.     89 y/o female h/o HTN, urinary incontinence, dementia (baseline around AAx2), lives at home with friend who is also in 90s, no other assistance, now presents with unable to care for self at home and pruritic rash to body x months, worse over approx 1 week, no other hous **HX and physical limited due to pt being poor historian. Supplemental information obtained from house staff and EMR. The emergency contact answers but seems to be hard of hearing.     89 YO F with a PMH of HTN, urinary incontinence, and dementia who was BIBEMS for eval of diffuse rash for the past x 1 month. Associated with increased urinary frequency. In the ED, Chest X-Ray is read as new left apical lung opacity. EKG shows new-onset atrial fibrillation. Pt started on IV steroids and IVFs (LR).     Physical exam shows pt in NAD. VSS, afebrile, not hypoxic on RA. A&Ox2 (Name/Place). Non-focal neuro exam. Muscle strength/sensation intact. CTA B/L with no W/C/R. Irregular, no M/G/R. ABD is soft and non-tender, normoactive BSs. LEs without swelling. Diffuse maculopapular rash. Labs and radiology as above.    New-onset atrial fibrillation, rate controlled. Tele admit. HD stable. Echo. TSH. A1c and lipids. Serial cardiac enzymes and EKGs. Therapeutic lovenox and transition to PO AC in the AM. CHADsVASC (>2). Cardio consult.    Diffuse maculopapular rash. Derm consult. Benadryl PRN.     Normocytic anemia, unknown baseline. Pt denies bleeding symptoms. Send anemia work-up. Replace as necessary.     Hypoalbuminemia, from poor oral intake. Nutrition eval.     HX of HTN and urinary incontinence. Restart home meds. DVT PPX. Inform PCP of pt's admission to hospital. My note supersedes the residents note.

## 2021-01-07 NOTE — ED PROVIDER NOTE - OBJECTIVE STATEMENT
89 yo F with pmhx of HTN with pruirtic rash urinary frequency for 1 week, worsened than before. pt lives at home with her partner who has been having trouble taking care of her. Denies any fevers chills, no chest pain.

## 2021-01-07 NOTE — H&P ADULT - NSHPSOCIALHISTORY_GEN_ALL_CORE
Marital Status:  (   )    (   ) Single    (   )    ( x )   Lives with: (  ) alone  (  ) children   (  ) spouse   (  ) parents  ( x ) other, friend  Recent Travel: No recent travel    Substance Use (street drugs): ( x ) never used  (  ) other:  Tobacco Usage:  ( x  ) never smoked   (   ) former smoker   (   ) current smoker  (     ) pack year  Alcohol Usage: None   Baseline mobility: unsure

## 2021-01-07 NOTE — ED ADULT TRIAGE NOTE - CHIEF COMPLAINT QUOTE
BIBA from home, generalized pruritic blotchy rash that started 2 days ago. unknown cause/allergen. Given Benadryl by family with minimal improvement. Hx of dementia, increased drowsiness and confusion. patient baseline mental status at this time as per EMS BIBA from home, generalized pruritic blotchy rash that started 2 days ago. unknown cause/allergen. Given Benadryl by family with minimal improvement. Hx of dementia, increased drowsiness and confusion and 1 episode of urinary incontinence. patient baseline mental status at this time as per EMS

## 2021-01-07 NOTE — ED ADULT NURSE NOTE - NSIMPLEMENTINTERV_GEN_ALL_ED
Implemented All Fall with Harm Risk Interventions:  [place] to call system. Call bell, personal items and telephone within reach. Instruct patient to call for assistance. Room bathroom lighting operational. Non-slip footwear when patient is off stretcher. Physically safe environment: no spills, clutter or unnecessary equipment. Stretcher in lowest position, wheels locked, appropriate side rails in place. Provide visual cue, wrist band, yellow gown, etc. Monitor gait and stability. Monitor for mental status changes and reorient to person, place, and time. Review medications for side effects contributing to fall risk. Reinforce activity limits and safety measures with patient and family. Provide visual clues: red socks.

## 2021-01-07 NOTE — PROVIDER CONTACT NOTE (OTHER) - SITUATION
pt received from ED HR noted to be , no tremor, pt denies CP. as per ED pt had Afib on arrival but VSS was now stable, ED did not document any VS.
pt just received from ED, no orders. pt's HR is elevated at .  EKG shows Afib with RVR. as per ED VSS, but no meds were ever given all in ED at all. pt denies CP. pt now has
pt received from ED, during skin assessment RN noted large purple/yellowish bruise to left side of upper thigh going up and around to left buttocks. pt is noted to be A&Ox1. when RN asked pt what

## 2021-01-07 NOTE — H&P ADULT - NSHPLABSRESULTS_GEN_ALL_CORE
11.9   8.89  )-----------( 226      ( 2021 12:31 )             35.7           139  |  105  |  24<H>  ----------------------------<  92  4.3   |  25  |  1.1    Ca    9.3      2021 12:31    TPro  6.7  /  Alb  3.4<L>  /  TBili  0.8  /  DBili  x   /  AST  14  /  ALT  5   /  AlkPhos  134<H>                Urinalysis Basic - ( 2021 11:44 )    Color: Colorless / Appearance: Clear / S.004 / pH: x  Gluc: x / Ketone: Negative  / Bili: Negative / Urobili: <2 mg/dL   Blood: x / Protein: Negative / Nitrite: Negative   Leuk Esterase: Negative / RBC: x / WBC x   Sq Epi: x / Non Sq Epi: x / Bacteria: x        PT/INR - ( 2021 12:31 )   PT: 12.40 sec;   INR: 1.08 ratio         PTT - ( 2021 12:31 )  PTT:27.1 sec    12 Lead ECG (21 @ 12:02)     Diagnosis Line Atrial flutter with variable A-V block  Voltage criteria for left ventricular hypertrophy  Abnormal ECG       Xray Chest 1 View- PORTABLE-Urgent (21 @ 13:23) >    The right lung not well evaluated due to rotation and overlapping cardiac silhouette.    New left apical opacity.

## 2021-01-07 NOTE — H&P ADULT - NSHPPHYSICALEXAM_GEN_ALL_CORE
PHYSICAL EXAM:  GENERAL: NAD, AAO x 2 (name, time), 89y F  HEAD:  Atraumatic, Normocephalic  EYES: EOMI, conjunctiva and sclera white  NECK: Supple, No JVD  CHEST/LUNG: Clear to auscultation bilaterally; No wheeze; No crackles; No accessory muscles used  HEART: Regular rate and rhythm; No murmurs;   ABDOMEN: Soft, Nontender, Nondistended; Bowel sounds present; No guarding, No organomegaly  EXTREMITIES:  2+ Peripheral Pulses, No cyanosis or edema  NEUROLOGY: non-focal  SKIN: rash over upper extremities

## 2021-01-07 NOTE — PROVIDER CONTACT NOTE (OTHER) - ACTION/TREATMENT ORDERED:
provider to place ordered and order Metoprolol
MD to assess pt at bedside to determine further intervention
provider to put in for EKG

## 2021-01-07 NOTE — H&P ADULT - ASSESSMENT
89 y/o female h/o HTN, urinary incontinence, dementia (baseline around AAx2) presented to ED for pruritic rash , increased urinary frequency and friend unable to take care at home. In ED EKG revealed a flutter with AV block which is new.       #A flutter with AV block new onset  - no past history  - follow cardiac enzymes @8pm, 11 30pm, serial EKGs  - s/p 1 dose of lovenox in ED  - pt on lopressor 50mg BID (as per friend she didnt take this medication today)  - c/w lopressor   - cardiology consult    #Pruritic rash over upper extremities  - erythematous macules, no vesicles on exam, likely dermatitis   - will give bendryl prn   - derm consult    #new left apical opacity  - no leucocytosis, no fever, pt asymptomatic  - repeat CXR as it appears rotated    #Increased urinary freq  - UA negative , benign physical exam  - will not start abx  - patient has urinary incontinence likely functional , c/w oxybutynin 5mg BID    #HTN(high)  - c/w lopressor 50mg bid, ON RAMIPRIL 10MG AT HOME WILL give lisinopril here  - DASH diet    #Social admission  - PT/Rehab consult    #DIET: DASH  #DVTppx:   #GIppx: ot indicated  #Activity:Increase as tolerated  #CODE: FULL  #Disposition: from home   89 y/o female h/o HTN, urinary incontinence, dementia (baseline around AAx2) presented to ED for pruritic rash , increased urinary frequency and friend unable to take care at home. In ED EKG revealed a flutter with AV block which is new.       #A flutter with AV block new onset  - no past history  -  s/p 40mg of  lovenox in ED, 1l LR bolus  - pt on lopressor 50mg BID (as per friend she didnt take this medication today)  - follow cardiac enzymes @8pm, 11 30pm, serial EKGs  - will give extra 16mg of lovenox (weight is 56kg) , will start lovenox 56 BID (therauptic dose)  - fu ECHO, TSH  - c/w lopressor 50 mg BID   - admit to telemetry  - cardiology consult    #Pruritic rash over upper extremities  - erythematous macules, no vesicles on exam, likely dermatitis   - will give bendryl prn   - derm consult    #new left apical opacity  - no leucocytosis, no fever, pt asymptomatic  - repeat CXR as it appears rotated    #Increased urinary freq  - UA negative , benign physical exam  - will not start abx  - patient has urinary incontinence likely functional , c/w oxybutynin 5mg BID    #HTN(high)  - c/w lopressor 50mg bid, ON RAMIPRIL 10MG AT HOME WILL give lisinopril here  - DASH diet    #Social admission  - PT/Rehab consult    #DIET: DASH  #DVTppx: lovenox  #GIppx: ot indicated  #Activity:Increase as tolerated  #CODE: FULL  #Disposition: from home

## 2021-01-07 NOTE — ED ADULT NURSE NOTE - CHIEF COMPLAINT QUOTE
BIBA from home, generalized pruritic blotchy rash that started 2 days ago. unknown cause/allergen. Given Benadryl by family with minimal improvement. Hx of dementia, increased drowsiness and confusion and 1 episode of urinary incontinence. patient baseline mental status at this time as per EMS

## 2021-01-07 NOTE — H&P ADULT - HISTORY OF PRESENT ILLNESS
Patient is a poor historian , spoke to patient's friend (whom patient addresses as : Mynor Macias: [phone]) History couldnt be obtained as patient's friend Mynor is a poor historian , hard of hearing .    89 y/o female h/o HTN, urinary incontinence, dementia (baseline around AAx2), lives at home with friend who is also in 90s, no other assistance, now presents with unable to care for self at home and pruritic rash to body x months, worse over approx 1 week, no other household members with similar complaints, denies new medications, fever,  environmental exposures, recent travel, denies other complaints at present. Patient friend reports that she had labwork done as she recently had increased urinary frequency as well. Patient denies any pain but points to her rash over both extremities .     ED Course: Vitals; BP: 146/106, HR: 88, RR: 18, Temp: 98.3F  Labs: unremarkable, COVID negative, CXR : new left apical opacity, UA: negative

## 2021-01-07 NOTE — ED PROVIDER NOTE - CARE PLAN
Principal Discharge DX:	Dementia   Principal Discharge DX:	Dementia  Secondary Diagnosis:	Afib   Principal Discharge DX:	Dementia  Secondary Diagnosis:	Afib  Secondary Diagnosis:	Atrial fibrillation, new onset  Secondary Diagnosis:	Rash

## 2021-01-07 NOTE — ED PROVIDER NOTE - PHYSICAL EXAMINATION
CONSTITUTIONAL: Well-developed; well-nourished; in no acute distress.   SKIN: warm, dry, + priuritic urticarial rash.  HEAD: Normocephalic; atraumatic.  EYES: PERRL, EOMI, no conjunctival erythema  ENT: No nasal discharge; airway clear.  NECK: Supple; non tender.  CARD: S1, S2 normal; no murmurs, gallops, or rubs. Regular rate and rhythm.   RESP: No wheezes, rales or rhonchi.  ABD: soft ntnd  EXT: Normal ROM.  No clubbing, cyanosis or edema.   LYMPH: No acute cervical adenopathy.  NEURO: Alert, oriented, grossly unremarkable  PSYCH: Cooperative, appropriate.

## 2021-01-07 NOTE — PROVIDER CONTACT NOTE (OTHER) - ASSESSMENT
right hip. pt denies pain to hip. b/l LE are same length. no irregular rotation noted to LLE. pt has normal ROM and +pedal pulses.

## 2021-01-08 DIAGNOSIS — R21 RASH AND OTHER NONSPECIFIC SKIN ERUPTION: ICD-10-CM

## 2021-01-08 DIAGNOSIS — I10 ESSENTIAL (PRIMARY) HYPERTENSION: ICD-10-CM

## 2021-01-08 DIAGNOSIS — I48.91 UNSPECIFIED ATRIAL FIBRILLATION: ICD-10-CM

## 2021-01-08 DIAGNOSIS — F03.90 UNSPECIFIED DEMENTIA WITHOUT BEHAVIORAL DISTURBANCE: ICD-10-CM

## 2021-01-08 LAB
A1C WITH ESTIMATED AVERAGE GLUCOSE RESULT: 5.2 % — SIGNIFICANT CHANGE UP (ref 4–5.6)
ALBUMIN SERPL ELPH-MCNC: 3.3 G/DL — LOW (ref 3.5–5.2)
ALP SERPL-CCNC: 129 U/L — HIGH (ref 30–115)
ALT FLD-CCNC: 6 U/L — SIGNIFICANT CHANGE UP (ref 0–41)
ANION GAP SERPL CALC-SCNC: 11 MMOL/L — SIGNIFICANT CHANGE UP (ref 7–14)
APTT BLD: 31.5 SEC — SIGNIFICANT CHANGE UP (ref 27–39.2)
AST SERPL-CCNC: 13 U/L — SIGNIFICANT CHANGE UP (ref 0–41)
BASOPHILS # BLD AUTO: 0.01 K/UL — SIGNIFICANT CHANGE UP (ref 0–0.2)
BASOPHILS NFR BLD AUTO: 0.1 % — SIGNIFICANT CHANGE UP (ref 0–1)
BILIRUB SERPL-MCNC: 0.7 MG/DL — SIGNIFICANT CHANGE UP (ref 0.2–1.2)
BUN SERPL-MCNC: 26 MG/DL — HIGH (ref 10–20)
CALCIUM SERPL-MCNC: 8.8 MG/DL — SIGNIFICANT CHANGE UP (ref 8.5–10.1)
CHLORIDE SERPL-SCNC: 107 MMOL/L — SIGNIFICANT CHANGE UP (ref 98–110)
CHOLEST SERPL-MCNC: 154 MG/DL — SIGNIFICANT CHANGE UP
CK MB CFR SERPL CALC: 1.1 NG/ML — SIGNIFICANT CHANGE UP (ref 0.6–6.3)
CK MB CFR SERPL CALC: 1.1 NG/ML — SIGNIFICANT CHANGE UP (ref 0.6–6.3)
CK SERPL-CCNC: 30 U/L — SIGNIFICANT CHANGE UP (ref 0–225)
CK SERPL-CCNC: 31 U/L — SIGNIFICANT CHANGE UP (ref 0–225)
CO2 SERPL-SCNC: 24 MMOL/L — SIGNIFICANT CHANGE UP (ref 17–32)
CREAT SERPL-MCNC: 1 MG/DL — SIGNIFICANT CHANGE UP (ref 0.7–1.5)
EOSINOPHIL # BLD AUTO: 0.02 K/UL — SIGNIFICANT CHANGE UP (ref 0–0.7)
EOSINOPHIL NFR BLD AUTO: 0.2 % — SIGNIFICANT CHANGE UP (ref 0–8)
ESTIMATED AVERAGE GLUCOSE: 103 MG/DL — SIGNIFICANT CHANGE UP (ref 68–114)
GLUCOSE SERPL-MCNC: 102 MG/DL — HIGH (ref 70–99)
HCT VFR BLD CALC: 34.1 % — LOW (ref 37–47)
HDLC SERPL-MCNC: 32 MG/DL — LOW
HGB BLD-MCNC: 10.8 G/DL — LOW (ref 12–16)
IMM GRANULOCYTES NFR BLD AUTO: 0.5 % — HIGH (ref 0.1–0.3)
INR BLD: 1.16 RATIO — SIGNIFICANT CHANGE UP (ref 0.65–1.3)
LACTATE SERPL-SCNC: 1.7 MMOL/L — SIGNIFICANT CHANGE UP (ref 0.7–2)
LIPID PNL WITH DIRECT LDL SERPL: 105 MG/DL — HIGH
LYMPHOCYTES # BLD AUTO: 1.73 K/UL — SIGNIFICANT CHANGE UP (ref 1.2–3.4)
LYMPHOCYTES # BLD AUTO: 19.7 % — LOW (ref 20.5–51.1)
MAGNESIUM SERPL-MCNC: 2.1 MG/DL — SIGNIFICANT CHANGE UP (ref 1.8–2.4)
MCHC RBC-ENTMCNC: 30.9 PG — SIGNIFICANT CHANGE UP (ref 27–31)
MCHC RBC-ENTMCNC: 31.7 G/DL — LOW (ref 32–37)
MCV RBC AUTO: 97.4 FL — SIGNIFICANT CHANGE UP (ref 81–99)
MONOCYTES # BLD AUTO: 0.32 K/UL — SIGNIFICANT CHANGE UP (ref 0.1–0.6)
MONOCYTES NFR BLD AUTO: 3.6 % — SIGNIFICANT CHANGE UP (ref 1.7–9.3)
NEUTROPHILS # BLD AUTO: 6.66 K/UL — HIGH (ref 1.4–6.5)
NEUTROPHILS NFR BLD AUTO: 75.9 % — HIGH (ref 42.2–75.2)
NON HDL CHOLESTEROL: 122 MG/DL — SIGNIFICANT CHANGE UP
NRBC # BLD: 0 /100 WBCS — SIGNIFICANT CHANGE UP (ref 0–0)
PHOSPHATE SERPL-MCNC: 3.6 MG/DL — SIGNIFICANT CHANGE UP (ref 2.1–4.9)
PLATELET # BLD AUTO: 206 K/UL — SIGNIFICANT CHANGE UP (ref 130–400)
POTASSIUM SERPL-MCNC: 4.1 MMOL/L — SIGNIFICANT CHANGE UP (ref 3.5–5)
POTASSIUM SERPL-SCNC: 4.1 MMOL/L — SIGNIFICANT CHANGE UP (ref 3.5–5)
PROT SERPL-MCNC: 5.7 G/DL — LOW (ref 6–8)
PROTHROM AB SERPL-ACNC: 13.3 SEC — HIGH (ref 9.95–12.87)
RBC # BLD: 3.5 M/UL — LOW (ref 4.2–5.4)
RBC # FLD: 13 % — SIGNIFICANT CHANGE UP (ref 11.5–14.5)
SARS-COV-2 IGG SERPL QL IA: NEGATIVE — SIGNIFICANT CHANGE UP
SARS-COV-2 IGM SERPL IA-ACNC: 0.32 INDEX — SIGNIFICANT CHANGE UP
SODIUM SERPL-SCNC: 142 MMOL/L — SIGNIFICANT CHANGE UP (ref 135–146)
TRIGL SERPL-MCNC: 140 MG/DL — SIGNIFICANT CHANGE UP
TROPONIN T SERPL-MCNC: <0.01 NG/ML — SIGNIFICANT CHANGE UP
TROPONIN T SERPL-MCNC: <0.01 NG/ML — SIGNIFICANT CHANGE UP
TSH SERPL-MCNC: 3.35 UIU/ML — SIGNIFICANT CHANGE UP (ref 0.27–4.2)
WBC # BLD: 8.78 K/UL — SIGNIFICANT CHANGE UP (ref 4.8–10.8)
WBC # FLD AUTO: 8.78 K/UL — SIGNIFICANT CHANGE UP (ref 4.8–10.8)

## 2021-01-08 PROCEDURE — 93010 ELECTROCARDIOGRAM REPORT: CPT

## 2021-01-08 PROCEDURE — 93306 TTE W/DOPPLER COMPLETE: CPT | Mod: 26

## 2021-01-08 PROCEDURE — 72170 X-RAY EXAM OF PELVIS: CPT | Mod: 26

## 2021-01-08 PROCEDURE — 72125 CT NECK SPINE W/O DYE: CPT | Mod: 26

## 2021-01-08 PROCEDURE — 71045 X-RAY EXAM CHEST 1 VIEW: CPT | Mod: 26

## 2021-01-08 PROCEDURE — 70450 CT HEAD/BRAIN W/O DYE: CPT | Mod: 26

## 2021-01-08 PROCEDURE — 99223 1ST HOSP IP/OBS HIGH 75: CPT | Mod: AI

## 2021-01-08 RX ORDER — METOPROLOL TARTRATE 50 MG
50 TABLET ORAL THREE TIMES A DAY
Refills: 0 | Status: DISCONTINUED | OUTPATIENT
Start: 2021-01-08 | End: 2021-01-09

## 2021-01-08 RX ORDER — ENOXAPARIN SODIUM 100 MG/ML
50 INJECTION SUBCUTANEOUS
Refills: 0 | Status: DISCONTINUED | OUTPATIENT
Start: 2021-01-08 | End: 2021-01-10

## 2021-01-08 RX ORDER — DIPHENHYDRAMINE HCL 50 MG
25 CAPSULE ORAL EVERY 8 HOURS
Refills: 0 | Status: DISCONTINUED | OUTPATIENT
Start: 2021-01-08 | End: 2021-01-09

## 2021-01-08 RX ORDER — ACETAMINOPHEN 500 MG
650 TABLET ORAL EVERY 6 HOURS
Refills: 0 | Status: DISCONTINUED | OUTPATIENT
Start: 2021-01-08 | End: 2021-01-12

## 2021-01-08 RX ADMIN — Medication 50 MILLIGRAM(S): at 21:10

## 2021-01-08 RX ADMIN — CHLORHEXIDINE GLUCONATE 1 APPLICATION(S): 213 SOLUTION TOPICAL at 06:02

## 2021-01-08 RX ADMIN — CHLORHEXIDINE GLUCONATE 1 APPLICATION(S): 213 SOLUTION TOPICAL at 00:34

## 2021-01-08 RX ADMIN — Medication 50 MILLIGRAM(S): at 12:26

## 2021-01-08 RX ADMIN — ENOXAPARIN SODIUM 15 MILLIGRAM(S): 100 INJECTION SUBCUTANEOUS at 00:33

## 2021-01-08 RX ADMIN — Medication 50 MILLIGRAM(S): at 06:01

## 2021-01-08 RX ADMIN — Medication 25 MILLIGRAM(S): at 21:10

## 2021-01-08 RX ADMIN — Medication 25 MILLIGRAM(S): at 14:55

## 2021-01-08 RX ADMIN — Medication 5 MILLIGRAM(S): at 06:01

## 2021-01-08 RX ADMIN — ENOXAPARIN SODIUM 50 MILLIGRAM(S): 100 INJECTION SUBCUTANEOUS at 16:51

## 2021-01-08 RX ADMIN — LISINOPRIL 40 MILLIGRAM(S): 2.5 TABLET ORAL at 06:02

## 2021-01-08 RX ADMIN — Medication 5 MILLIGRAM(S): at 16:51

## 2021-01-08 RX ADMIN — Medication 650 MILLIGRAM(S): at 20:09

## 2021-01-08 RX ADMIN — ENOXAPARIN SODIUM 56 MILLIGRAM(S): 100 INJECTION SUBCUTANEOUS at 06:01

## 2021-01-08 NOTE — CONSULT NOTE ADULT - ASSESSMENT
IMPRESSION: Rehab of gait disorder    PRECAUTIONS: [    ] Cardiac  [    ] Respiratory  [    ] Seizures [    ] Contact Isolation  [    ] Droplet Isolation  [    ] Other    Weight Bearing Status:  wbat    RECOMMENDATION:    Out of Bed to Chair     DVT/Decubiti Prophylaxis    REHAB PLAN:     [  x   ] Bedside P/T 3-5 times a week   [   x  ] Bedside O/T  2-3 times a week   [     ] No Rehab Therapy Indicated   [     ]  Speech Therapy   Conditioning/ROM                                 ADL  Bed Mobility                                            Conditioning/ROM  Transfers                                                  Bed Mobility  Sitting /Standing Balance                      Transfers                                        Gait Training                                            Sitting/Standing Balance  Stair Training [   ]Applicable                 Home equipment Eval                                                                     Splinting  [   ] Only      GOALS:   ADL   [    ]   Independent         Transfers  [    ] Independent            Ambulation  [     ] Independent     [  x   ] With device                            [ x   ]  CG                                               [  x  ]  CG                                                    [ x    ] CG                            [    ] Min A                                          [    ] Min A                                                [     ] Min  A          DISCHARGE PLAN:   [     ]  Good candidate for Intensive Rehabilitation/Hospital based-[facility]A [facility]                                             Will tolerate 3hrs Intensive Rehab Daily                         VS             [  x    ]  Short Term Rehab in Skilled Nursing Facility                                       [   x   ]  Home with Outpatient or VN services                                         [      ]  Possible Candidate for Intensive Hospital based Rehab

## 2021-01-08 NOTE — PROGRESS NOTE ADULT - ASSESSMENT
89 y/o female h/o HTN, urinary incontinence, dementia (baseline around AAx2) presented to ED for pruritic rash , increased urinary frequency and friend unable to take care at home. In ED EKG revealed a flutter with AV block which is new.     #A flutter with AV block new onset  - no past history of arrhythmia  -  s/p 40mg of  lovenox in ED, 1l LR bolus  - pt on lopressor 50mg BID (as per friend she didnt take this medication today)  - follow cardiac enzymes @8pm, 11 30pm, serial EKGs  - will give extra 16mg of lovenox (weight is 56kg) , will start lovenox 56 BID (therauptic dose)  - ECHO ordered  - F/u TSH  -  lopressor 50 mg TID  - tele monitoring  - cardiology consult    #Pruritic rash over upper extremities  - erythematous macules, no vesicles on exam, likely dermatitis   - bendryl 25 q8hrs, monitor for anticholinergic sxs, sedation  - derm following    #new left apical opacity  - no leucocytosis, no fever, pt asymptomatic  - may require additional imaging for monitoring    #Increased urinary freq  - UA negative , benign physical exam  - will not start abx  - patient has urinary incontinence likely functional , c/w oxybutynin 5mg BID    #HTN(high)  - c/w lopressor 50mg bid, ON RAMIPRIL 10MG AT HOME WILL give lisinopril here  - DASH diet    #Social admission  - PT/Rehab consult    #DIET: DASH  #DVTppx: lovenox  #GIppx: ot indicated  #Activity:Increase as tolerated  #GOC: Living Will and Health Care Proxy documentation received, MOLST completed to reflect wishes, DNR/DNI  #Disposition: from home

## 2021-01-08 NOTE — CONSULT NOTE ADULT - ASSESSMENT
Impression   -Acute rash looks urticarial in nature   -please make benadryl 25 mg every 6 hours standing to watch for improvement (not PRN)   -sia re-assess rash distribution in am   -if persistent would benefit from skin biopsy for now monitor with antihistamines

## 2021-01-08 NOTE — CONSULT NOTE ADULT - SUBJECTIVE AND OBJECTIVE BOX
HPI:  Patient is a poor historian ,spoke to patient's son merlene for detailed history     89 y/o female h/o HTN, urinary incontinence, dementia (baseline around AAx2), lives at home with friend who is also in 90s, no other assistance, now presents with unable to care for self at home and pruritic rash to body for 2 days duration. As per son her rash started 2 days ago acutely and it was diffuse everywhere. He denies any changes in habits, no change in food no new medications or new occupational expoures. As per son she became more confused than baseline so he decided to admit her. When he saw her on wednesday she did not have any rash .   She has a new apical opacity on CXR    ED Course: Vitals; BP: 146/106, HR: 88, RR: 18, Temp: 98.3F  Labs: unremarkable, COVID negative, CXR : new left apical opacity, UA: negative   (2021 18:59)      PAST MEDICAL & SURGICAL HISTORY:  Urinary incontinence    Dementia    HTN (hypertension)    No significant past surgical history          MEDICATIONS  (STANDING):  chlorhexidine 4% Liquid 1 Application(s) Topical <User Schedule>  enoxaparin Injectable 50 milliGRAM(s) SubCutaneous two times a day  lisinopril 40 milliGRAM(s) Oral daily  metoprolol tartrate 50 milliGRAM(s) Oral three times a day  oxybutynin 5 milliGRAM(s) Oral two times a day    MEDICATIONS  (PRN):  diphenhydrAMINE 25 milliGRAM(s) Oral every 8 hours PRN Rash and/or Itching      Allergies    No Known Allergies    Intolerances        SOCIAL HISTORY:    FAMILY HISTORY:  No pertinent family history in first degree relatives        Vital Signs Last 24 Hrs  T(C): 36.7 (2021 08:47), Max: 36.7 (2021 08:47)  T(F): 98 (2021 08:47), Max: 98 (2021 08:47)  HR: 97 (2021 12:25) (62 - 144)  BP: 154/92 (2021 12:25) (120/62 - 187/75)  BP(mean): --  RR: 18 (2021 08:47) (18 - 18)  SpO2: 95% (2021 09:25) (95% - 97%)      LABS:                        10.8   8.78  )-----------( 206      ( 2021 05:51 )             34.1         142  |  107  |  26<H>  ----------------------------<  102<H>  4.1   |  24  |  1.0    Ca    8.8      2021 05:51  Phos  3.6       Mg     2.1         TPro  5.7<L>  /  Alb  3.3<L>  /  TBili  0.7  /  DBili  x   /  AST  13  /  ALT  6   /  AlkPhos  129<H>      PT/INR - ( 2021 05:51 )   PT: 13.30 sec;   INR: 1.16 ratio         PTT - ( 2021 05:51 )  PTT:31.5 sec  Urinalysis Basic - ( 2021 11:44 )    Color: Colorless / Appearance: Clear / S.004 / pH: x  Gluc: x / Ketone: Negative  / Bili: Negative / Urobili: <2 mg/dL   Blood: x / Protein: Negative / Nitrite: Negative   Leuk Esterase: Negative / RBC: x / WBC x   Sq Epi: x / Non Sq Epi: x / Bacteria: x    PHYSICAL EXAM:  GENERAL: No acute distress, well-developed  HEAD:  Atraumatic, Normocephalic  CHEST/LUNG: CTAB; No wheezes, rales, or rhonchi  NEUROLOGY: A&O x 2, no focal deficits  SKIN: No rashes or lesions, diffuse maculopapular rash with lesions on torso, back inguinal area, forearms

## 2021-01-08 NOTE — CONSULT NOTE ADULT - PROBLEM SELECTOR RECOMMENDATION 9
new onset afib  agree with higher metoprolol dose for better hr control  issue of anticoagulation  pt is currently on anticoagulation but h/h has dropped slightly and pt had a recent fall.  the benefit to continue this will be decided on wether h/h remains stable and does not drop further and that pt is in a supervised environment and not likely to fall.  if both of these are not met then risk of bleeding is > benefit of stroke prevention of anticoagulation and would not continue anticoagulation.  f/u h/h  pt with mr and ef mildly decreased ?secondary to tachycardic induced cm.  would hr control and conservative medical mgt and no intervention of mr.

## 2021-01-08 NOTE — CONSULT NOTE ADULT - SUBJECTIVE AND OBJECTIVE BOX
Patient is a 89y old  Female who presents with a chief complaint of rash, increased urine frequency (2021 18:17) and change in MS      HPI:    89 y/o female h/o HTN, urinary incontinence, dementia (baseline around AAx2), lives at home with friend who is also in 90s, no other assistance, now presents with unable to care for self at home and pruritic rash to body x 2 days and worsening mental status the last day.  pt had a fall 1 month ago but denies hitting her head.  pt denies cp, palpitations, leg edema nor sob.  pt is not aware she is in the hospital but knows her name and that this is the "first"month. pt in er was found to be in afib/aflutter with rapid v response.  l. Patient denies any pain but points to her rash over both extremities .     ED Course: Vitals; BP: 146/106, HR: 88, RR: 18, Temp: 98.3F  Labs: unremarkable, COVID negative, CXR : new left apical opacity, UA: negative   (2021 18:59)      PAST MEDICAL & SURGICAL HISTORY:  Urinary incontinence    Dementia    HTN (hypertension)    dropped bladder surgery  left shoulder surgery  cataract surgery  bilateral hip fx        PREVIOUS DIAGNOSTIC TESTING:      ECHO  FINDINGS:Summary:   1. Left ventricular ejection fraction, by visual estimation, is 45 to 50%.   2. Mildly decreased global left ventricular systolic function.   3. Mildly decreased segmental left ventricular systolic function.   4. Apical septal segment and apex are abnormal as described above.   5. Moderately enlarged left atrium.   6. Moderately enlarged right atrium.   7. Mild thickening and calcification of the anterior mitral valve leaflet.   8. Moderate mitral annular calcification.   9.Moderate to severe mitral valve regurgitation.  10. By color the MR appeasr Mod to svere , By PISA and regurgitant volume it isonly moderate.  11. Moderate tricuspid regurgitation.  12. PSAP 35.  13. Mild aortic regurgitation.  14. Sclerotic aortic valve with decreased opening.  15. AV openig miuildly restricted without significant gradient.          MEDICATIONS  (STANDING):  chlorhexidine 4% Liquid 1 Application(s) Topical <User Schedule>  diphenhydrAMINE 25 milliGRAM(s) Oral every 8 hours  enoxaparin Injectable 50 milliGRAM(s) SubCutaneous two times a day  lisinopril 40 milliGRAM(s) Oral daily  metoprolol tartrate 50 milliGRAM(s) Oral three times a day  oxybutynin 5 milliGRAM(s) Oral two times a day    MEDICATIONS  (PRN):      FAMILY HISTORY:  No pertinent family history in first degree relatives        SOCIAL HISTORY:  CIGARETTES:never  ALCOHOL: non  DRUGS: never                      REVIEW OF SYSTEMS:  CONSTITUTIONAL: No distress, Looks stable  NECK: No pain or stiffness  RESPIRATORY: No cough, wheezing, shortness of breath  CARDIOVASCULAR: No chest pain, SOB, palpitations, leg swelling  GASTROINTESTINAL: No abdominal or epigastric pain. No nausea, vomiting, or hematemesis;  No melena.  NEUROLOGICAL: No dizziness, headaches,   SKIN: No itching, burning, (+)shes, or lesions   ENDOCRINE: No heat or cold intolerance  MUSCULOSKELETAL: No joint pain, No  swelling; No muscle pain  PSYCHIATRIC: No depression, anxiety, mood swings, or difficulty sleeping  ALLERGY: No hives,(+) itching, rash          Vital Signs Last 24 Hrs  T(C): 37.3 (2021 13:51), Max: 37.3 (2021 13:51)  T(F): 99.2 (2021 13:51), Max: 99.2 (2021 13:51)  HR: 95 (2021 13:51) (62 - 108)  BP: 140/98 (2021 13:51) (120/87 - 187/75)  BP(mean): --  RR: 18 (2021 13:51) (18 - 18)  SpO2: 95% (2021 09:25) (95% - 97%)                      PHYSICAL EXAM:  GENERAL: No distress, thin  HEAD:  Atraumatic, Normocephalic  NECK: Supple, No JVD, No Bruit of either carotid arteries  NERVOUS SYSTEM:  Alert, Awake, Oriented to time,  person; poor short term memory.  CHEST/LUNG: Normal air entry to lung base bilaterally; No wheeze, crackle, rales, rhonchi  HEART: irregular heart beat, S1, A2, P2, No S3, No S4, No gallop, 2/6 hsem apexr  ABDOMEN: Soft, Non tender, Non distended; Bowel sounds present  EXTREMITIES:  2+ Peripheral Pulses, No clubbing, No edema  SKIN: (+) rashes or lesions(+) echymosis    TELEMETRY: afib with mod v response    ECG:  Diagnosis Line Atrial fibrillation with rapid ventricular response  Voltage criteria for left ventricular hypertrophy  Nonspecific T wave abnormality  Abnormal ECG    CXRAYImpression:    Stable left apical opacity. No pneumothorax        I&O's Detail    2021 07:01  -  2021 07:00  --------------------------------------------------------  IN:    Oral Fluid: 120 mL  Total IN: 120 mL    OUT:    Voided (mL): 800 mL  Total OUT: 800 mL    Total NET: -680 mL      2021 07:01  -  2021 19:50  --------------------------------------------------------  IN:    Oral Fluid: 480 mL  Total IN: 480 mL    OUT:    Voided (mL): 300 mL  Total OUT: 300 mL    Total NET: 180 mL          LABS:                        10.8   8.78  )-----------( 206      ( 2021 05:51 )             34.1     01-08    142  |  107  |  26<H>  ----------------------------<  102<H>  4.1   |  24  |  1.0    Ca    8.8      2021 05:51  Phos  3.6     -08  Mg     2.1     -08    TPro  5.7<L>  /  Alb  3.3<L>  /  TBili  0.7  /  DBili  x   /  AST  13  /  ALT  6   /  AlkPhos  129<H>  01-08    CARDIAC MARKERS ( 2021 00:18 )  x     / <0.01 ng/mL / 30 U/L / x     / 1.1 ng/mL  CARDIAC MARKERS ( 2021 22:05 )  x     / <0.01 ng/mL / 31 U/L / x     / 1.1 ng/mL      PT/INR - ( 2021 05:51 )   PT: 13.30 sec;   INR: 1.16 ratio         PTT - ( 2021 05:51 )  PTT:31.5 sec  Urinalysis Basic - ( 2021 11:44 )    Color: Colorless / Appearance: Clear / S.004 / pH: x  Gluc: x / Ketone: Negative  / Bili: Negative / Urobili: <2 mg/dL   Blood: x / Protein: Negative / Nitrite: Negative   Leuk Esterase: Negative / RBC: x / WBC x   Sq Epi: x / Non Sq Epi: x / Bacteria: x      I&O's Summary    2021 07:01  -  2021 07:00  --------------------------------------------------------  IN: 120 mL / OUT: 800 mL / NET: -680 mL    2021 07:01  -  2021 19:50  --------------------------------------------------------  IN: 480 mL / OUT: 300 mL / NET: 180 mL        RADIOLOGY & ADDITIONAL STUDIES:

## 2021-01-09 LAB
ALBUMIN SERPL ELPH-MCNC: 3.3 G/DL — LOW (ref 3.5–5.2)
ALP SERPL-CCNC: 133 U/L — HIGH (ref 30–115)
ALT FLD-CCNC: 7 U/L — SIGNIFICANT CHANGE UP (ref 0–41)
ANION GAP SERPL CALC-SCNC: 12 MMOL/L — SIGNIFICANT CHANGE UP (ref 7–14)
APPEARANCE UR: CLEAR — SIGNIFICANT CHANGE UP
AST SERPL-CCNC: 17 U/L — SIGNIFICANT CHANGE UP (ref 0–41)
BASOPHILS # BLD AUTO: 0.01 K/UL — SIGNIFICANT CHANGE UP (ref 0–0.2)
BASOPHILS NFR BLD AUTO: 0.1 % — SIGNIFICANT CHANGE UP (ref 0–1)
BILIRUB SERPL-MCNC: 0.8 MG/DL — SIGNIFICANT CHANGE UP (ref 0.2–1.2)
BILIRUB UR-MCNC: NEGATIVE — SIGNIFICANT CHANGE UP
BUN SERPL-MCNC: 21 MG/DL — HIGH (ref 10–20)
CALCIUM SERPL-MCNC: 8.5 MG/DL — SIGNIFICANT CHANGE UP (ref 8.5–10.1)
CHLORIDE SERPL-SCNC: 109 MMOL/L — SIGNIFICANT CHANGE UP (ref 98–110)
CO2 SERPL-SCNC: 22 MMOL/L — SIGNIFICANT CHANGE UP (ref 17–32)
COLOR SPEC: SIGNIFICANT CHANGE UP
CREAT SERPL-MCNC: 0.9 MG/DL — SIGNIFICANT CHANGE UP (ref 0.7–1.5)
CULTURE RESULTS: SIGNIFICANT CHANGE UP
DIFF PNL FLD: NEGATIVE — SIGNIFICANT CHANGE UP
EOSINOPHIL # BLD AUTO: 0.09 K/UL — SIGNIFICANT CHANGE UP (ref 0–0.7)
EOSINOPHIL NFR BLD AUTO: 0.9 % — SIGNIFICANT CHANGE UP (ref 0–8)
GLUCOSE SERPL-MCNC: 90 MG/DL — SIGNIFICANT CHANGE UP (ref 70–99)
GLUCOSE UR QL: NEGATIVE — SIGNIFICANT CHANGE UP
HCT VFR BLD CALC: 36.6 % — LOW (ref 37–47)
HGB BLD-MCNC: 12.1 G/DL — SIGNIFICANT CHANGE UP (ref 12–16)
IMM GRANULOCYTES NFR BLD AUTO: 0.2 % — SIGNIFICANT CHANGE UP (ref 0.1–0.3)
KETONES UR-MCNC: NEGATIVE — SIGNIFICANT CHANGE UP
LEUKOCYTE ESTERASE UR-ACNC: NEGATIVE — SIGNIFICANT CHANGE UP
LYMPHOCYTES # BLD AUTO: 2.07 K/UL — SIGNIFICANT CHANGE UP (ref 1.2–3.4)
LYMPHOCYTES # BLD AUTO: 21.8 % — SIGNIFICANT CHANGE UP (ref 20.5–51.1)
MAGNESIUM SERPL-MCNC: 2 MG/DL — SIGNIFICANT CHANGE UP (ref 1.8–2.4)
MCHC RBC-ENTMCNC: 30.6 PG — SIGNIFICANT CHANGE UP (ref 27–31)
MCHC RBC-ENTMCNC: 33.1 G/DL — SIGNIFICANT CHANGE UP (ref 32–37)
MCV RBC AUTO: 92.4 FL — SIGNIFICANT CHANGE UP (ref 81–99)
MONOCYTES # BLD AUTO: 0.38 K/UL — SIGNIFICANT CHANGE UP (ref 0.1–0.6)
MONOCYTES NFR BLD AUTO: 4 % — SIGNIFICANT CHANGE UP (ref 1.7–9.3)
NEUTROPHILS # BLD AUTO: 6.94 K/UL — HIGH (ref 1.4–6.5)
NEUTROPHILS NFR BLD AUTO: 73 % — SIGNIFICANT CHANGE UP (ref 42.2–75.2)
NITRITE UR-MCNC: NEGATIVE — SIGNIFICANT CHANGE UP
NRBC # BLD: 0 /100 WBCS — SIGNIFICANT CHANGE UP (ref 0–0)
PH UR: 7.5 — SIGNIFICANT CHANGE UP (ref 5–8)
PLATELET # BLD AUTO: 229 K/UL — SIGNIFICANT CHANGE UP (ref 130–400)
POTASSIUM SERPL-MCNC: 3.8 MMOL/L — SIGNIFICANT CHANGE UP (ref 3.5–5)
POTASSIUM SERPL-SCNC: 3.8 MMOL/L — SIGNIFICANT CHANGE UP (ref 3.5–5)
PROT SERPL-MCNC: 5.8 G/DL — LOW (ref 6–8)
PROT UR-MCNC: SIGNIFICANT CHANGE UP
RBC # BLD: 3.96 M/UL — LOW (ref 4.2–5.4)
RBC # FLD: 13.1 % — SIGNIFICANT CHANGE UP (ref 11.5–14.5)
SODIUM SERPL-SCNC: 143 MMOL/L — SIGNIFICANT CHANGE UP (ref 135–146)
SP GR SPEC: 1.01 — SIGNIFICANT CHANGE UP (ref 1.01–1.03)
SPECIMEN SOURCE: SIGNIFICANT CHANGE UP
UROBILINOGEN FLD QL: SIGNIFICANT CHANGE UP
WBC # BLD: 9.51 K/UL — SIGNIFICANT CHANGE UP (ref 4.8–10.8)
WBC # FLD AUTO: 9.51 K/UL — SIGNIFICANT CHANGE UP (ref 4.8–10.8)

## 2021-01-09 PROCEDURE — 99233 SBSQ HOSP IP/OBS HIGH 50: CPT

## 2021-01-09 PROCEDURE — 71045 X-RAY EXAM CHEST 1 VIEW: CPT | Mod: 26

## 2021-01-09 RX ORDER — METOPROLOL TARTRATE 50 MG
5 TABLET ORAL ONCE
Refills: 0 | Status: COMPLETED | OUTPATIENT
Start: 2021-01-09 | End: 2021-01-09

## 2021-01-09 RX ORDER — METOPROLOL TARTRATE 50 MG
50 TABLET ORAL
Refills: 0 | Status: DISCONTINUED | OUTPATIENT
Start: 2021-01-09 | End: 2021-01-12

## 2021-01-09 RX ADMIN — Medication 50 MILLIGRAM(S): at 05:40

## 2021-01-09 RX ADMIN — ENOXAPARIN SODIUM 50 MILLIGRAM(S): 100 INJECTION SUBCUTANEOUS at 05:41

## 2021-01-09 RX ADMIN — Medication 25 MILLIGRAM(S): at 12:39

## 2021-01-09 RX ADMIN — Medication 50 MILLIGRAM(S): at 12:39

## 2021-01-09 RX ADMIN — Medication 5 MILLIGRAM(S): at 05:40

## 2021-01-09 RX ADMIN — LISINOPRIL 40 MILLIGRAM(S): 2.5 TABLET ORAL at 05:40

## 2021-01-09 RX ADMIN — CHLORHEXIDINE GLUCONATE 1 APPLICATION(S): 213 SOLUTION TOPICAL at 05:42

## 2021-01-09 RX ADMIN — Medication 25 MILLIGRAM(S): at 05:41

## 2021-01-09 RX ADMIN — ENOXAPARIN SODIUM 50 MILLIGRAM(S): 100 INJECTION SUBCUTANEOUS at 18:03

## 2021-01-09 RX ADMIN — Medication 5 MILLIGRAM(S): at 18:04

## 2021-01-09 RX ADMIN — Medication 50 MILLIGRAM(S): at 18:04

## 2021-01-09 RX ADMIN — Medication 5 MILLIGRAM(S): at 21:17

## 2021-01-09 NOTE — PROGRESS NOTE ADULT - ASSESSMENT
89 y/o female h/o HTN, urinary incontinence, dementia (baseline around AAx2) presented to ED for pruritic rash , increased urinary frequency and friend unable to take care at home. In ED EKG revealed a flutter with AV block which is new.     #A flutter with AV block new onset  - no past history of arrhythmia  -  s/p 40mg of  lovenox in ED, 1l LR bolus  - pt on lopressor 50mg BID (as per friend she didnt take this medication today)  - will give extra 16mg of lovenox (weight is 56kg) , will start lovenox 56 BID (therauptic dose)  - ECHO EF 61%  - TSH 3.35  -  lopressor 50 mg TID  - tele monitoring  - cardiology consulted    #Pruritic rash over upper extremities  - erythematous macules, no vesicles on exam, likely dermatitis   - bendryl discontinued, patient noticed to be slightly lethargic  - derm following    #new left apical opacity  - no leucocytosis, no fever, pt asymptomatic  - may require additional imaging for monitoring    #Increased urinary freq  - UA negative , benign physical exam  - patient has urinary incontinence likely functional , c/w oxybutynin 5mg BID    #HTN(high)  - c/w lopressor 50mg bid, ON RAMIPRIL 10MG AT HOME WILL give lisinopril here  - DASH diet    #Social admission  - PT/Rehab consult    #DIET: DASH  #DVTppx: lovenox  #GIppx: ot indicated  #Activity:Increase as tolerated  #GOC: Living Will and Health Care Proxy documentation received, MOLST completed to reflect wishes, DNR/DNI  #Disposition: from home

## 2021-01-09 NOTE — PROGRESS NOTE ADULT - ATTENDING COMMENTS
patient seen and examined independently  agree with above note with the following additions:     A/P   Afib RVR: rate better on lopressor 50 TID can increase to 50 q6h if needed if becomes tachy again. on lovenox 50 BID for anticoagulation. will need to discuss with son benefits and risks of longterm anticoagulation hgb is 12 no signs of bleeding the main concern here is falls. echo noted EF 45% and has valvular disease. medical treatment     [id number] UA neg , follow blood culture, check procal , no clinical evidence of pna clinically, check procal monitor off abx     Patchy left upper lobe opacity, partially visualized on CT neck . will proceed with CT chest     s/p fall prior to admission : cth and neck no acute fractures. follow hip xray read.     rash improved: avoid benadryl making patient lethargic. monitor skin rash     dementia history patient was alert and oriented times 4 when I saw her today    HTN: recheck and c/w current meds for now    DNR/DNI     PT eval pending hip xray read

## 2021-01-10 LAB
ALBUMIN SERPL ELPH-MCNC: 3.4 G/DL — LOW (ref 3.5–5.2)
ALP SERPL-CCNC: 134 U/L — HIGH (ref 30–115)
ALT FLD-CCNC: 8 U/L — SIGNIFICANT CHANGE UP (ref 0–41)
ANION GAP SERPL CALC-SCNC: 11 MMOL/L — SIGNIFICANT CHANGE UP (ref 7–14)
AST SERPL-CCNC: 16 U/L — SIGNIFICANT CHANGE UP (ref 0–41)
BASOPHILS # BLD AUTO: 0.02 K/UL — SIGNIFICANT CHANGE UP (ref 0–0.2)
BASOPHILS NFR BLD AUTO: 0.2 % — SIGNIFICANT CHANGE UP (ref 0–1)
BILIRUB SERPL-MCNC: 1.1 MG/DL — SIGNIFICANT CHANGE UP (ref 0.2–1.2)
BUN SERPL-MCNC: 15 MG/DL — SIGNIFICANT CHANGE UP (ref 10–20)
CALCIUM SERPL-MCNC: 8.6 MG/DL — SIGNIFICANT CHANGE UP (ref 8.5–10.1)
CHLORIDE SERPL-SCNC: 104 MMOL/L — SIGNIFICANT CHANGE UP (ref 98–110)
CO2 SERPL-SCNC: 24 MMOL/L — SIGNIFICANT CHANGE UP (ref 17–32)
CREAT SERPL-MCNC: 0.8 MG/DL — SIGNIFICANT CHANGE UP (ref 0.7–1.5)
EOSINOPHIL # BLD AUTO: 0.07 K/UL — SIGNIFICANT CHANGE UP (ref 0–0.7)
EOSINOPHIL NFR BLD AUTO: 0.8 % — SIGNIFICANT CHANGE UP (ref 0–8)
GLUCOSE SERPL-MCNC: 96 MG/DL — SIGNIFICANT CHANGE UP (ref 70–99)
HCT VFR BLD CALC: 36.3 % — LOW (ref 37–47)
HGB BLD-MCNC: 12.2 G/DL — SIGNIFICANT CHANGE UP (ref 12–16)
IMM GRANULOCYTES NFR BLD AUTO: 0.3 % — SIGNIFICANT CHANGE UP (ref 0.1–0.3)
LYMPHOCYTES # BLD AUTO: 1.54 K/UL — SIGNIFICANT CHANGE UP (ref 1.2–3.4)
LYMPHOCYTES # BLD AUTO: 17.1 % — LOW (ref 20.5–51.1)
MAGNESIUM SERPL-MCNC: 1.9 MG/DL — SIGNIFICANT CHANGE UP (ref 1.8–2.4)
MCHC RBC-ENTMCNC: 31.1 PG — HIGH (ref 27–31)
MCHC RBC-ENTMCNC: 33.6 G/DL — SIGNIFICANT CHANGE UP (ref 32–37)
MCV RBC AUTO: 92.6 FL — SIGNIFICANT CHANGE UP (ref 81–99)
MONOCYTES # BLD AUTO: 0.44 K/UL — SIGNIFICANT CHANGE UP (ref 0.1–0.6)
MONOCYTES NFR BLD AUTO: 4.9 % — SIGNIFICANT CHANGE UP (ref 1.7–9.3)
NEUTROPHILS # BLD AUTO: 6.89 K/UL — HIGH (ref 1.4–6.5)
NEUTROPHILS NFR BLD AUTO: 76.7 % — HIGH (ref 42.2–75.2)
NRBC # BLD: 0 /100 WBCS — SIGNIFICANT CHANGE UP (ref 0–0)
PLATELET # BLD AUTO: 210 K/UL — SIGNIFICANT CHANGE UP (ref 130–400)
POTASSIUM SERPL-MCNC: 3.6 MMOL/L — SIGNIFICANT CHANGE UP (ref 3.5–5)
POTASSIUM SERPL-SCNC: 3.6 MMOL/L — SIGNIFICANT CHANGE UP (ref 3.5–5)
PROCALCITONIN SERPL-MCNC: 0.05 NG/ML — SIGNIFICANT CHANGE UP (ref 0.02–0.1)
PROT SERPL-MCNC: 5.9 G/DL — LOW (ref 6–8)
RBC # BLD: 3.92 M/UL — LOW (ref 4.2–5.4)
RBC # FLD: 13 % — SIGNIFICANT CHANGE UP (ref 11.5–14.5)
SODIUM SERPL-SCNC: 139 MMOL/L — SIGNIFICANT CHANGE UP (ref 135–146)
WBC # BLD: 8.99 K/UL — SIGNIFICANT CHANGE UP (ref 4.8–10.8)
WBC # FLD AUTO: 8.99 K/UL — SIGNIFICANT CHANGE UP (ref 4.8–10.8)

## 2021-01-10 PROCEDURE — 71250 CT THORAX DX C-: CPT | Mod: 26

## 2021-01-10 PROCEDURE — 99233 SBSQ HOSP IP/OBS HIGH 50: CPT

## 2021-01-10 RX ORDER — APIXABAN 2.5 MG/1
2.5 TABLET, FILM COATED ORAL
Refills: 0 | Status: DISCONTINUED | OUTPATIENT
Start: 2021-01-10 | End: 2021-01-12

## 2021-01-10 RX ORDER — DILTIAZEM HCL 120 MG
30 CAPSULE, EXT RELEASE 24 HR ORAL THREE TIMES A DAY
Refills: 0 | Status: DISCONTINUED | OUTPATIENT
Start: 2021-01-10 | End: 2021-01-12

## 2021-01-10 RX ADMIN — Medication 50 MILLIGRAM(S): at 05:40

## 2021-01-10 RX ADMIN — Medication 5 MILLIGRAM(S): at 05:42

## 2021-01-10 RX ADMIN — LISINOPRIL 40 MILLIGRAM(S): 2.5 TABLET ORAL at 05:42

## 2021-01-10 RX ADMIN — Medication 30 MILLIGRAM(S): at 21:33

## 2021-01-10 RX ADMIN — Medication 50 MILLIGRAM(S): at 13:01

## 2021-01-10 RX ADMIN — Medication 5 MILLIGRAM(S): at 17:18

## 2021-01-10 RX ADMIN — Medication 30 MILLIGRAM(S): at 13:01

## 2021-01-10 RX ADMIN — ENOXAPARIN SODIUM 50 MILLIGRAM(S): 100 INJECTION SUBCUTANEOUS at 05:40

## 2021-01-10 RX ADMIN — Medication 50 MILLIGRAM(S): at 17:18

## 2021-01-10 RX ADMIN — CHLORHEXIDINE GLUCONATE 1 APPLICATION(S): 213 SOLUTION TOPICAL at 05:41

## 2021-01-10 RX ADMIN — Medication 50 MILLIGRAM(S): at 02:41

## 2021-01-10 RX ADMIN — APIXABAN 2.5 MILLIGRAM(S): 2.5 TABLET, FILM COATED ORAL at 17:19

## 2021-01-10 NOTE — PHYSICAL THERAPY INITIAL EVALUATION ADULT - ASSISTIVE DEVICE, REHAB EVAL
Patient calls for refill  amphetamine-dextroamphetamine (ADDERALL XR) 25 MG 24 hr capsule  Take 1 capsule by mouth 2 times daily.   Eprescribe, Disp-60 capsule, R-0 last written 2/13/18    Last visit ADD 2/12/18  Excerpt of visit note dated 2/12/18 below, indented, for your review.  She presents to discuss recent positive drug screens. She has attention deficit disorder and was in on November 14 for annual follow up. At that time we entered into a controlled substance contract. She denied any illicit drug use. Her drug screen was positive for benzodiazepines. She admitted to using alprazolam and Valium for relief of headaches. Initial confirmatory benzodiazepine levels had multiple different benzodiazepines. We did a repeat drug screen January 5 and again it was positive for benzodiazepines. She comes in today to discuss these results. She admits to occasionally using benzodiazepines to relieve her headache. She has been getting them illicitly. Her urine drug screen is negative today. She would like to stay on Adderall. She is finishing up college. She feels it helps her focus and relieves some of her anxiety. She is willing to abstain from any further illicit drug use. She agrees to more frequent drug screen testing. She otherwise feels well and denies any concerns.    No pending appointment.     PDMP review shows no suspicious activity, still criteria not met because confirmed illicit drug use. Forwarded to Physician/CHRISTINE for further review and decision on medication(s) requested.     Ok to refill as previous?  ________________  Only need to return call if there are questions or concerns regarding request.  Patient aware message will be addressed tomorrow.    bed rails

## 2021-01-10 NOTE — OCCUPATIONAL THERAPY INITIAL EVALUATION ADULT - PERTINENT HX OF CURRENT PROBLEM, REHAB EVAL
As per chart review, pt and boyfriend poor historians. 89 y/o female h/o HTN, urinary incontinence, dementia (baseline around AAx2), lives at home with friend who is also in 90s, no other assistance, now presents with unable to care for self at home and pruritic rash to body x months, worse over approx 1 week

## 2021-01-10 NOTE — OCCUPATIONAL THERAPY INITIAL EVALUATION ADULT - ADDITIONAL COMMENTS
Pt is poor historian. As per chart review, sonBartolome manages bills/banking and cooking/delivery of meals. Boyfriend assists with cooking. Resides in 1 level home.

## 2021-01-10 NOTE — PROGRESS NOTE ADULT - ASSESSMENT
Afib RVR: rate not ideally controlled  on lopressor 50 QID. will add cardizem 30 q8h. Discussed benefits of starting anticoagulation to prevent strokes from Afib/Aflutter and risks involved in starting anticoagulantion including risk of bleeding, hemorrhagic stroke, GI bleed and even death. Son Henrik agreed to start anticoagulation given benifits outweighs risk. and he said that she will have 24 hour supervision care at home     Patchy left upper lobe opacity, partially visualized on CT neck . will proceed with CT chest was cancelled yesterday     s/p fall prior to admission : cth and neck no acute fractures. hip xray no fracture     rash improved: avoid benadryl making patient lethargic. monitor skin rash     dementia history patient was alert and oriented times 4     HTN: was high. on lopressot 50 q6h and on lisinopril 40 qday  will also add cardizem 30 q8h for better rate control. follow BP     DNR/DNI     PT eval

## 2021-01-10 NOTE — OCCUPATIONAL THERAPY INITIAL EVALUATION ADULT - IMPAIRED TRANSFERS: SIT/STAND, REHAB EVAL
impaired balance/cognition/decreased flexibility/narrow base of support/decreased ROM/decreased strength

## 2021-01-10 NOTE — OCCUPATIONAL THERAPY INITIAL EVALUATION ADULT - PATIENT PROFILE REVIEW, REHAB EVAL
Pt's chart thoroughly reviewed./yes
Tx: [id number] Pt chart thoroughly reviewed prior to OT evaluation./yes

## 2021-01-10 NOTE — PHYSICAL THERAPY INITIAL EVALUATION ADULT - GENERAL OBSERVATIONS, REHAB EVAL
PT [place] 01:00-01:25pm. Case discussed with JUSTYN Llamas. as per Muriel Barillas, pt has no weight bearing restrictions.  Pt encountered walking with RN to bathroom with HHA. + IV lock, + tele.

## 2021-01-10 NOTE — PHYSICAL THERAPY INITIAL EVALUATION ADULT - PERTINENT HX OF CURRENT PROBLEM, REHAB EVAL
Chart reviewed. Pt on hold as per Dr. Lopez, pending trauma verification. To f/u as appropriate.
89 y/o female h/o HTN, urinary incontinence, dementia (baseline around AAx2), lives at home with friend

## 2021-01-11 ENCOUNTER — TRANSCRIPTION ENCOUNTER (OUTPATIENT)
Age: 86
End: 2021-01-11

## 2021-01-11 LAB
ALBUMIN SERPL ELPH-MCNC: 3.4 G/DL — LOW (ref 3.5–5.2)
ALP SERPL-CCNC: 139 U/L — HIGH (ref 30–115)
ALT FLD-CCNC: 10 U/L — SIGNIFICANT CHANGE UP (ref 0–41)
ANION GAP SERPL CALC-SCNC: 10 MMOL/L — SIGNIFICANT CHANGE UP (ref 7–14)
AST SERPL-CCNC: 19 U/L — SIGNIFICANT CHANGE UP (ref 0–41)
BASOPHILS # BLD AUTO: 0.01 K/UL — SIGNIFICANT CHANGE UP (ref 0–0.2)
BASOPHILS NFR BLD AUTO: 0.1 % — SIGNIFICANT CHANGE UP (ref 0–1)
BILIRUB SERPL-MCNC: 0.9 MG/DL — SIGNIFICANT CHANGE UP (ref 0.2–1.2)
BUN SERPL-MCNC: 18 MG/DL — SIGNIFICANT CHANGE UP (ref 10–20)
CALCIUM SERPL-MCNC: 8.6 MG/DL — SIGNIFICANT CHANGE UP (ref 8.5–10.1)
CHLORIDE SERPL-SCNC: 105 MMOL/L — SIGNIFICANT CHANGE UP (ref 98–110)
CO2 SERPL-SCNC: 25 MMOL/L — SIGNIFICANT CHANGE UP (ref 17–32)
CREAT SERPL-MCNC: 0.9 MG/DL — SIGNIFICANT CHANGE UP (ref 0.7–1.5)
EOSINOPHIL # BLD AUTO: 0.1 K/UL — SIGNIFICANT CHANGE UP (ref 0–0.7)
EOSINOPHIL NFR BLD AUTO: 1.2 % — SIGNIFICANT CHANGE UP (ref 0–8)
GLUCOSE SERPL-MCNC: 94 MG/DL — SIGNIFICANT CHANGE UP (ref 70–99)
HCT VFR BLD CALC: 36.8 % — LOW (ref 37–47)
HGB BLD-MCNC: 12.5 G/DL — SIGNIFICANT CHANGE UP (ref 12–16)
IMM GRANULOCYTES NFR BLD AUTO: 0.4 % — HIGH (ref 0.1–0.3)
LYMPHOCYTES # BLD AUTO: 1.59 K/UL — SIGNIFICANT CHANGE UP (ref 1.2–3.4)
LYMPHOCYTES # BLD AUTO: 19.7 % — LOW (ref 20.5–51.1)
MAGNESIUM SERPL-MCNC: 2.1 MG/DL — SIGNIFICANT CHANGE UP (ref 1.8–2.4)
MCHC RBC-ENTMCNC: 31.3 PG — HIGH (ref 27–31)
MCHC RBC-ENTMCNC: 34 G/DL — SIGNIFICANT CHANGE UP (ref 32–37)
MCV RBC AUTO: 92 FL — SIGNIFICANT CHANGE UP (ref 81–99)
MONOCYTES # BLD AUTO: 0.4 K/UL — SIGNIFICANT CHANGE UP (ref 0.1–0.6)
MONOCYTES NFR BLD AUTO: 5 % — SIGNIFICANT CHANGE UP (ref 1.7–9.3)
NEUTROPHILS # BLD AUTO: 5.93 K/UL — SIGNIFICANT CHANGE UP (ref 1.4–6.5)
NEUTROPHILS NFR BLD AUTO: 73.6 % — SIGNIFICANT CHANGE UP (ref 42.2–75.2)
NRBC # BLD: 0 /100 WBCS — SIGNIFICANT CHANGE UP (ref 0–0)
NT-PROBNP SERPL-SCNC: 2239 PG/ML — HIGH (ref 0–300)
PLATELET # BLD AUTO: 208 K/UL — SIGNIFICANT CHANGE UP (ref 130–400)
POTASSIUM SERPL-MCNC: 3.3 MMOL/L — LOW (ref 3.5–5)
POTASSIUM SERPL-SCNC: 3.3 MMOL/L — LOW (ref 3.5–5)
PROT SERPL-MCNC: 6.2 G/DL — SIGNIFICANT CHANGE UP (ref 6–8)
RBC # BLD: 4 M/UL — LOW (ref 4.2–5.4)
RBC # FLD: 12.9 % — SIGNIFICANT CHANGE UP (ref 11.5–14.5)
SODIUM SERPL-SCNC: 140 MMOL/L — SIGNIFICANT CHANGE UP (ref 135–146)
WBC # BLD: 8.06 K/UL — SIGNIFICANT CHANGE UP (ref 4.8–10.8)
WBC # FLD AUTO: 8.06 K/UL — SIGNIFICANT CHANGE UP (ref 4.8–10.8)

## 2021-01-11 PROCEDURE — 99232 SBSQ HOSP IP/OBS MODERATE 35: CPT

## 2021-01-11 RX ORDER — POTASSIUM CHLORIDE 20 MEQ
20 PACKET (EA) ORAL ONCE
Refills: 0 | Status: COMPLETED | OUTPATIENT
Start: 2021-01-11 | End: 2021-01-11

## 2021-01-11 RX ORDER — APIXABAN 2.5 MG/1
1 TABLET, FILM COATED ORAL
Qty: 60 | Refills: 0
Start: 2021-01-11 | End: 2021-02-09

## 2021-01-11 RX ORDER — FUROSEMIDE 40 MG
20 TABLET ORAL ONCE
Refills: 0 | Status: DISCONTINUED | OUTPATIENT
Start: 2021-01-11 | End: 2021-01-11

## 2021-01-11 RX ORDER — FUROSEMIDE 40 MG
20 TABLET ORAL ONCE
Refills: 0 | Status: COMPLETED | OUTPATIENT
Start: 2021-01-11 | End: 2021-01-11

## 2021-01-11 RX ADMIN — CHLORHEXIDINE GLUCONATE 1 APPLICATION(S): 213 SOLUTION TOPICAL at 06:25

## 2021-01-11 RX ADMIN — LISINOPRIL 40 MILLIGRAM(S): 2.5 TABLET ORAL at 06:25

## 2021-01-11 RX ADMIN — Medication 50 MILLIGRAM(S): at 06:24

## 2021-01-11 RX ADMIN — Medication 30 MILLIGRAM(S): at 06:23

## 2021-01-11 RX ADMIN — Medication 30 MILLIGRAM(S): at 21:51

## 2021-01-11 RX ADMIN — Medication 50 MILLIEQUIVALENT(S): at 13:14

## 2021-01-11 RX ADMIN — Medication 50 MILLIGRAM(S): at 06:22

## 2021-01-11 RX ADMIN — Medication 50 MILLIGRAM(S): at 16:58

## 2021-01-11 RX ADMIN — Medication 5 MILLIGRAM(S): at 06:24

## 2021-01-11 RX ADMIN — Medication 20 MILLIGRAM(S): at 14:22

## 2021-01-11 RX ADMIN — Medication 50 MILLIGRAM(S): at 11:37

## 2021-01-11 RX ADMIN — Medication 5 MILLIGRAM(S): at 16:58

## 2021-01-11 RX ADMIN — APIXABAN 2.5 MILLIGRAM(S): 2.5 TABLET, FILM COATED ORAL at 06:24

## 2021-01-11 RX ADMIN — APIXABAN 2.5 MILLIGRAM(S): 2.5 TABLET, FILM COATED ORAL at 16:58

## 2021-01-11 RX ADMIN — Medication 30 MILLIGRAM(S): at 13:15

## 2021-01-11 NOTE — PROGRESS NOTE ADULT - ASSESSMENT
Afib RVR: controlled now on lopressor 50 QID. added cardizem 30 q8h on 1/10/21 . Discussed benefits of starting anticoagulation to prevent strokes from Afib/Aflutter and risks involved in starting anticoagulantion including risk of bleeding, hemorrhagic stroke, GI bleed and even death. Son Henrik agreed to start anticoagulation given benifits outweighs risk. and he said that she will have 24 hour supervision care at home     CT chest showed:     1. Cardiomegaly with small to moderate size bilateral pleural effusions. Small pericardial effusion.    2. Bilateral upper lobe peripheral predominant parenchymal groundglass consolidations, left greater than right. Findings are nonspecific and may reflect volume overload as well as infectious/inflammatory etiology.    3. Nondisplaced fractures anterior right 6th, 7th, 8th, 9th and 10th ribs of indeterminate chronicity.    CT scan findings could be related to CHF. will check bnp. on clinical exam patient crespo snot look in fluid overload. if BP ok later today then can add lasix 20 mg qday. will get pulm eval for CT chest findings     s/p fall prior to admission : cth and neck no acute fractures. hip xray no fracture     rash improved: avoid benadryl making patient lethargic. monitor skin rash     dementia history patient was alert and oriented times 4     HTN: on low side this morning will d/c lisinopril 40 . on lopressor 50 q6h and on lisinopril 40 qday on cardizem 30 q8h  rate control. follow BP today     DNR/DNI : molst form  in chart     discharge pending pulm eval. monitor BP in the next 24 hours. son planning to get 24 hour care for patient at home.  will contact son today. possible discharge in 24 hours

## 2021-01-11 NOTE — CONSULT NOTE ADULT - SUBJECTIVE AND OBJECTIVE BOX
Pulmonary Consulted for bilateral peripheral upper lobe ground glass consolidation and b/l  pleural effusions     HPI:  89 y/o female h/o HTN, urinary incontinence, dementia (baseline around AAx2), lives at home with friend who is also in 90s, no other assistance,   now presents with unable to care for self at home and pruritic rash to body x months, worse over approx 1 week, no other household members   with similar complaints, denies new medications, fever,  environmental exposures, recent travel, denies other complaints at present. Patient friend   reports that she had labwork done as she recently had increased urinary frequency as well. Patient denies any pain but points to her rash over   both extremities .    PAST MEDICAL & SURGICAL HISTORY:  Urinary incontinence    Dementia    HTN (hypertension)    No significant past surgical history          FAMILY HISTORY:  No pertinent family history in first degree relatives        SOCIAL HISTORY:    No smoking no drug or alcohol abuse .     Allergies    No Known Allergies    Intolerances              REVIEW OF SYSTEMS:  Constitutional: No fevers or chills or weight loss.   Eyes: No itching or discharge from the eyes  ENT:  No post nasal drip. No epistaxis. No throat pain. . No difficulty swallowing.   CV: No chest pain. No palpitations.  or dizziness.   Resp: No dyspnea  No wheezing. No cough. No stridor No sputum production. No chest pain with breathing .  GI: No nausea. No vomiting. No diarrhea or abdominal pain   MSK: No joint pain or pain in any extremities  Integumentary: No skin lesions. No pedal edema.  Neurological: No gross motor weakness. No sensory changes.      OBJECTIVE:        PHYSICAL EXAM:  General: Awake, alert, oriented X 1-2, stable on RA  HEENT: Atraumatic, normocephalic.   Neck: No JVD no lymphadenopathy   Respiratory: normal vesicular breathing with no wheeze, crackels  or rales on the exam .  Cardiovascular: S1 S2 normal. No murmurs, rubs or gallops.   Abdomen: Soft, non-tender, non-distended. No organomegaly.  Extremities: Warm to touch. Peripheral pulse palpable. No lower extremities edema.   Skin: No rashes or skin lesions  Neurological: Motor and sensory examination equal and normal in all four extremities.  Psychiatry: Appropriate mood and affect.    HOSPITAL MEDICATIONS:  MEDICATIONS  (STANDING):  apixaban 2.5 milliGRAM(s) Oral two times a day  chlorhexidine 4% Liquid 1 Application(s) Topical <User Schedule>  diltiazem    Tablet 30 milliGRAM(s) Oral three times a day  metoprolol tartrate 50 milliGRAM(s) Oral four times a day  oxybutynin 5 milliGRAM(s) Oral two times a day    MEDICATIONS  (PRN):  acetaminophen   Tablet .. 650 milliGRAM(s) Oral every 6 hours PRN Temp greater or equal to 38C (100.4F)      LABS:                        12.5   8.06  )-----------( 208      ( 11 Jan 2021 04:30 )             36.8     01-11    140  |  105  |  18  ----------------------------<  94  3.3<L>   |  25  |  0.9    Ca    8.6      11 Jan 2021 04:30  Mg     2.1     01-11    TPro  6.2  /  Alb  3.4<L>  /  TBili  0.9  /  DBili  x   /  AST  19  /  ALT  10  /  AlkPhos  139<H>  01-11    < from: CT Chest No Cont (01.10.21 @ 10:55) >  IMPRESSION:    1. Cardiomegaly with small to moderate size bilateral pleural effusions. Small pericardial effusion.    2. Bilateral upper lobe peripheral predominant parenchymal groundglass consolidations, left greater than right. Findings are nonspecific and may reflect volume overload as well as infectious/inflammatory etiology.    3. Nondisplaced fractures anterior right 6th, 7th, 8th, 9th and 10th ribs of indeterminate chronicity.    < end of copied text >  < from: TTE Echo Complete w/o Contrast w/ Doppler (01.08.21 @ 14:52) >    PHYSICIAN INTERPRETATION:  Left Ventricle: The left ventricular internal cavity size is normal. Global LV systolic function was mildly decreased. Left ventricular ejection fraction, by visual estimation, is 45 to 50%. Mildly decreased segmental left ventricular systolic function.      LV Wall Scoring:  The apical septal segment and apex are hypokinetic. All remaining scored  segments are normal.    Left Atrium: Moderately enlarged left atrium.  Right Atrium: Moderately enlarged right atrium.  Pericardium: There is no evidence of pericardial effusion.  Mitral Valve: The mitral valve is normal in structure. Mild thickening and calcification of the anterior mitral valve leaflet. There is moderate mitral annular calcification. Moderate to severe mitral valve regurgitation is seen. By color the MR appeasr Mod to svere , By PISA and regurgitant volume it isonly moderate.  Tricuspid Valve: The tricuspid valve is normal in structure. Moderate tricuspid regurgitation is visualized. PSAP 35.  Aortic Valve: The aortic valve is trileaflet. Sclerotic aortic valve with decreased opening. Mild aortic valve regurgitation is seen. AV openig miuildly restricted without significant gradient.  Pulmonic Valve: Trace pulmonic valve regurgitation.  Aorta: The aortic root is normal in size and structure.    < end of copied text >                       Pulmonary Consulted for bilateral peripheral upper lobe ground glass consolidation and b/l  pleural effusions     HPI:  89 y/o female h/o HTN, urinary incontinence, dementia (baseline around AAx2), lives at home with friend who is also in 90s, no other assistance,  now presents with unable to care for self at home and pruritic rash to body x months, worse over approx 1 week, no other household members with similar complaints, denies new medications, fever,  environmental exposures, recent travel, denies other complaints at present. Patient friend  reports that she had labwork done as she recently had increased urinary frequency as well. Patient denies any pain but points to her rash over both extremities .patient found to be in A fib, CHest ct bl effusion called to evaluate    PAST MEDICAL & SURGICAL HISTORY:  Urinary incontinence    Dementia    HTN (hypertension)    No significant past surgical history          FAMILY HISTORY:  No pertinent family history in first degree relatives        SOCIAL HISTORY:    No smoking no drug or alcohol abuse .     Allergies    No Known Allergies    Intolerances              REVIEW OF SYSTEMS:  Constitutional: No fevers or chills or weight loss.   Eyes: No itching or discharge from the eyes  ENT:  No post nasal drip. No epistaxis. No throat pain. . No difficulty swallowing.   CV: No chest pain. No palpitations.  or dizziness.   Resp: sob  GI: No nausea. No vomiting. No diarrhea or abdominal pain   MSK: No joint pain or pain in any extremities  Integumentary: No skin lesions. No pedal edema.  Neurological: No gross motor weakness. No sensory changes.      ICU Vital Signs Last 24 Hrs  T(C): 36.2 (11 Jan 2021 13:00), Max: 37.1 (10 Sean 2021 20:17)  T(F): 97.1 (11 Jan 2021 13:00), Max: 98.8 (10 Sean 2021 20:17)  HR: 88 (11 Jan 2021 13:00) (68 - 92)  BP: 165/98 (11 Jan 2021 13:00) (104/78 - 165/98)  RR: 18 (11 Jan 2021 13:00) (18 - 19)  SpO2: 94% (11 Jan 2021 07:57) (94% - 94%)          PHYSICAL EXAM:  General: Awake, alert, oriented X 1-2, stable on RA  HEENT: Atraumatic, normocephalic.   Neck: no lymphadenopathy   Respiratory: dec bs both bases  Cardiovascular: MAGDA 3/6  Abdomen: Soft, non-tender, non-distended. No organomegaly.  Extremities: Warm to touch. Peripheral pulse palpable. No lower extremities edema.   Skin: No rashes or skin lesions  Neurological: Motor and sensory examination equal and normal in all four extremities.  Psychiatry: Appropriate mood and affect.    HOSPITAL MEDICATIONS:  MEDICATIONS  (STANDING):  apixaban 2.5 milliGRAM(s) Oral two times a day  chlorhexidine 4% Liquid 1 Application(s) Topical <User Schedule>  diltiazem    Tablet 30 milliGRAM(s) Oral three times a day  metoprolol tartrate 50 milliGRAM(s) Oral four times a day  oxybutynin 5 milliGRAM(s) Oral two times a day    MEDICATIONS  (PRN):  acetaminophen   Tablet .. 650 milliGRAM(s) Oral every 6 hours PRN Temp greater or equal to 38C (100.4F)      LABS:                        12.5   8.06  )-----------( 208      ( 11 Jan 2021 04:30 )             36.8     01-11    140  |  105  |  18  ----------------------------<  94  3.3<L>   |  25  |  0.9    Ca    8.6      11 Jan 2021 04:30  Mg     2.1     01-11    TPro  6.2  /  Alb  3.4<L>  /  TBili  0.9  /  DBili  x   /  AST  19  /  ALT  10  /  AlkPhos  139<H>  01-11    < from: CT Chest No Cont (01.10.21 @ 10:55) >  IMPRESSION:    1. Cardiomegaly with small to moderate size bilateral pleural effusions. Small pericardial effusion.    2. Bilateral upper lobe peripheral predominant parenchymal groundglass consolidations, left greater than right. Findings are nonspecific and may reflect volume overload as well as infectious/inflammatory etiology.    3. Nondisplaced fractures anterior right 6th, 7th, 8th, 9th and 10th ribs of indeterminate chronicity.    < end of copied text >  < from: TTE Echo Complete w/o Contrast w/ Doppler (01.08.21 @ 14:52) >    PHYSICIAN INTERPRETATION:  Left Ventricle: The left ventricular internal cavity size is normal. Global LV systolic function was mildly decreased. Left ventricular ejection fraction, by visual estimation, is 45 to 50%. Mildly decreased segmental left ventricular systolic function.      LV Wall Scoring:  The apical septal segment and apex are hypokinetic. All remaining scored  segments are normal.    Left Atrium: Moderately enlarged left atrium.  Right Atrium: Moderately enlarged right atrium.  Pericardium: There is no evidence of pericardial effusion.  Mitral Valve: The mitral valve is normal in structure. Mild thickening and calcification of the anterior mitral valve leaflet. There is moderate mitral annular calcification. Moderate to severe mitral valve regurgitation is seen. By color the MR appeasr Mod to svere , By PISA and regurgitant volume it isonly moderate.  Tricuspid Valve: The tricuspid valve is normal in structure. Moderate tricuspid regurgitation is visualized. PSAP 35.  Aortic Valve: The aortic valve is trileaflet. Sclerotic aortic valve with decreased opening. Mild aortic valve regurgitation is seen. AV openig miuildly restricted without significant gradient.  Pulmonic Valve: Trace pulmonic valve regurgitation.  Aorta: The aortic root is normal in size and structure.    < end of copied text >

## 2021-01-11 NOTE — CONSULT NOTE ADULT - CONSULT REASON
mobilization
bilateral upper lungs opacities and b/l pleural effusions
diffuse maculopapular rash
new onset afib/aflutter

## 2021-01-11 NOTE — CONSULT NOTE ADULT - ASSESSMENT
89 y/o female h/o HTN, urinary incontinence, dementia (baseline around AAx2) admitted for evaluation of diffuse skin rash, found to have new onset Afib, MR and CHF.  Pulm team consulted for evaluation of bilateral peripheral ground glass opacities on CT scan      Impressions:  #CHF with mild reduced EF, tachycardia induced   #b/l upper consolidations and pleural efusions noted on CT scan, 2/2 to HF, normal Procal, wbc, no fever, and stable on RA,   #new onset Afib on AC  #moderate to severe MR  #chronic multiple anterior R ribs fracture 6-10    #Recommendations  - start diuretics 20mg qd if BP tolerate  - check Pro BNP  - given ribs fracture would suggest frequent incentive spirometer   - no further acute pulm intervention   89 y/o female h/o HTN, urinary incontinence, dementia (baseline around AAx2) admitted for evaluation of diffuse skin rash, found to have new onset Afib, MR and CHF.  Pulm team consulted for evaluation of bilateral peripheral ground glass opacities on CT scan      Impressions:  #CHF with mild reduced EF, Mod- severe MR  #b/l upper consolidations and pleural efusions noted on CT scan, 2/2 to HF, normal Procal, wbc, no fever, and stable on RA,   #new onset Afib on AC  #chronic multiple anterior R ribs fracture 6-10    #Recommendations  - Diuresis  - check Pro BNP  - given ribs fracture would suggest frequent incentive spirometer   - no further acute pulm intervention  will follow

## 2021-01-11 NOTE — CONSULT NOTE ADULT - ATTENDING COMMENTS
Yesterday I was sent photos showing large urticarial plaque on forearm, and diffuse erythematous rash.    Today patient is comfortable in bed , says she only has slight itching.    Exam shows large erythematous patch on right forearm and diffuse morbilliform macular mildly erythematous rash on trunk and extremities.    Impression--  Improved since yesterday with Benadryl.  Differential dx includes drug eruption [not likely according to son] and viral exanthem.    Treat symptomatically with antihistamines.
patient seen and examined, agree with above , BL GGO, pleural effusion/ CHF/ A FIB/ Mod to severe MR/ diuresis no thora, BP control

## 2021-01-11 NOTE — DISCHARGE NOTE NURSING/CASE MANAGEMENT/SOCIAL WORK - PATIENT PORTAL LINK FT
You can access the FollowMyHealth Patient Portal offered by [facility] by registering at the following website: [email or web address]. By joining [facility]’s FollowMyHealth portal, you will also be able to view your health information using other applications (apps) compatible with our system.

## 2021-01-11 NOTE — CONSULT NOTE ADULT - REASON FOR ADMISSION
rash, increased urine frequency

## 2021-01-11 NOTE — DISCHARGE NOTE NURSING/CASE MANAGEMENT/SOCIAL WORK - NSCORESITESY/N_GEN_A_CORE_RD
2018        Hipolito Garrison MD  [address]  [address]  [facility]  [facility] [address]             [facility]  [address]  [facility] [address]  Phone: [phone]  Fax: [phone]   Patient: Sonu Castro   MR Number: [id number]   YOB: 2017   Date of Visit: 2018     Dear Dr. Garrison,     2018      Cardiology Clearance    Patient Name:  Sonu Castro  : 2017  Diagnosis:   1. Patent foramen ovale    2. Prematurity      Sonu Castro was last seen in this office on 2018. There is no cardiological contraindication for planned procedure based on that examination. Careful monitoring is always warranted. Based on this information, I would recommend the procedure be done in a hospital setting.    IE precautions are not indicated at this time.      We would very much appreciate a copy of your findings.    MD Kelly Bunn, FNP-C      Sincerely,      Kelly Jansen, NP            CC  No Recipients    Enclosure          No

## 2021-01-11 NOTE — PROGRESS NOTE ADULT - ASSESSMENT
89 y/o female h/o HTN, urinary incontinence, dementia (baseline around AAx2) presented to ED for pruritic rash , increased urinary frequency and friend unable to take care at home. In ED EKG revealed a flutter with AV block which is new.     #A flutter with AV block new onset  - no past history of arrhythmia  -  s/p 40mg of  lovenox in ED, 1l LR bolus  - pt on lopressor 50mg BID (as per friend she didnt take this medication today)  - will give extra 16mg of lovenox (weight is 56kg) , will start lovenox 56 BID (therauptic dose)  - ECHO EF 61%  - TSH 3.35  -  lopressor 50 mg TID  - tele monitoring  - cardiology following  - CT chest: B/L pleural effusions, moderate groundglass opacities L>R  - Pulmonology consulted, recommending diuresis  - BNP ordered    #Pruritic rash over upper extremities  - erythematous macules, no vesicles on exam, likely dermatitis   - bendryl discontinued, patient noticed to be slightly lethargic  - derm following    #new left apical opacity  - no leucocytosis, no fever, pt asymptomatic  - may require additional imaging for monitoring    #Increased urinary freq  - UA negative , benign physical exam  - patient has urinary incontinence likely functional , c/w oxybutynin 5mg BID    #HTN(high)  - c/w lopressor 50mg bid, ON RAMIPRIL 10MG AT HOME WILL give lisinopril here  - DASH diet    #Social admission  - PT/Rehab consult    #DIET: DASH  #DVTppx: lovenox  #GIppx: ot indicated  #Activity:Increase as tolerated  #GOC: Living Will and Health Care Proxy documentation received, MOLST completed to reflect wishes, DNR/DNI  #Disposition: from home

## 2021-01-12 ENCOUNTER — TRANSCRIPTION ENCOUNTER (OUTPATIENT)
Age: 86
End: 2021-01-12

## 2021-01-12 VITALS
RESPIRATION RATE: 18 BRPM | TEMPERATURE: 98 F | DIASTOLIC BLOOD PRESSURE: 90 MMHG | HEART RATE: 108 BPM | SYSTOLIC BLOOD PRESSURE: 154 MMHG

## 2021-01-12 DIAGNOSIS — I48.92 UNSPECIFIED ATRIAL FLUTTER: ICD-10-CM

## 2021-01-12 DIAGNOSIS — I34.0 NONRHEUMATIC MITRAL (VALVE) INSUFFICIENCY: ICD-10-CM

## 2021-01-12 LAB
ALBUMIN SERPL ELPH-MCNC: 3.4 G/DL — LOW (ref 3.5–5.2)
ALP SERPL-CCNC: 130 U/L — HIGH (ref 30–115)
ALT FLD-CCNC: 10 U/L — SIGNIFICANT CHANGE UP (ref 0–41)
ANION GAP SERPL CALC-SCNC: 12 MMOL/L — SIGNIFICANT CHANGE UP (ref 7–14)
AST SERPL-CCNC: 17 U/L — SIGNIFICANT CHANGE UP (ref 0–41)
BASOPHILS # BLD AUTO: 0.03 K/UL — SIGNIFICANT CHANGE UP (ref 0–0.2)
BASOPHILS NFR BLD AUTO: 0.3 % — SIGNIFICANT CHANGE UP (ref 0–1)
BILIRUB SERPL-MCNC: 0.8 MG/DL — SIGNIFICANT CHANGE UP (ref 0.2–1.2)
BUN SERPL-MCNC: 18 MG/DL — SIGNIFICANT CHANGE UP (ref 10–20)
CALCIUM SERPL-MCNC: 9 MG/DL — SIGNIFICANT CHANGE UP (ref 8.5–10.1)
CHLORIDE SERPL-SCNC: 104 MMOL/L — SIGNIFICANT CHANGE UP (ref 98–110)
CO2 SERPL-SCNC: 23 MMOL/L — SIGNIFICANT CHANGE UP (ref 17–32)
CREAT SERPL-MCNC: 0.8 MG/DL — SIGNIFICANT CHANGE UP (ref 0.7–1.5)
EOSINOPHIL # BLD AUTO: 0.09 K/UL — SIGNIFICANT CHANGE UP (ref 0–0.7)
EOSINOPHIL NFR BLD AUTO: 1 % — SIGNIFICANT CHANGE UP (ref 0–8)
GLUCOSE SERPL-MCNC: 98 MG/DL — SIGNIFICANT CHANGE UP (ref 70–99)
HCT VFR BLD CALC: 35.4 % — LOW (ref 37–47)
HGB BLD-MCNC: 12.1 G/DL — SIGNIFICANT CHANGE UP (ref 12–16)
IMM GRANULOCYTES NFR BLD AUTO: 0.2 % — SIGNIFICANT CHANGE UP (ref 0.1–0.3)
LYMPHOCYTES # BLD AUTO: 1.95 K/UL — SIGNIFICANT CHANGE UP (ref 1.2–3.4)
LYMPHOCYTES # BLD AUTO: 21.6 % — SIGNIFICANT CHANGE UP (ref 20.5–51.1)
MAGNESIUM SERPL-MCNC: 2 MG/DL — SIGNIFICANT CHANGE UP (ref 1.8–2.4)
MCHC RBC-ENTMCNC: 30.9 PG — SIGNIFICANT CHANGE UP (ref 27–31)
MCHC RBC-ENTMCNC: 34.2 G/DL — SIGNIFICANT CHANGE UP (ref 32–37)
MCV RBC AUTO: 90.5 FL — SIGNIFICANT CHANGE UP (ref 81–99)
MONOCYTES # BLD AUTO: 0.43 K/UL — SIGNIFICANT CHANGE UP (ref 0.1–0.6)
MONOCYTES NFR BLD AUTO: 4.8 % — SIGNIFICANT CHANGE UP (ref 1.7–9.3)
NEUTROPHILS # BLD AUTO: 6.52 K/UL — HIGH (ref 1.4–6.5)
NEUTROPHILS NFR BLD AUTO: 72.1 % — SIGNIFICANT CHANGE UP (ref 42.2–75.2)
NRBC # BLD: 0 /100 WBCS — SIGNIFICANT CHANGE UP (ref 0–0)
PLATELET # BLD AUTO: 217 K/UL — SIGNIFICANT CHANGE UP (ref 130–400)
POTASSIUM SERPL-MCNC: 3.5 MMOL/L — SIGNIFICANT CHANGE UP (ref 3.5–5)
POTASSIUM SERPL-SCNC: 3.5 MMOL/L — SIGNIFICANT CHANGE UP (ref 3.5–5)
PROT SERPL-MCNC: 6.2 G/DL — SIGNIFICANT CHANGE UP (ref 6–8)
RBC # BLD: 3.91 M/UL — LOW (ref 4.2–5.4)
RBC # FLD: 13.1 % — SIGNIFICANT CHANGE UP (ref 11.5–14.5)
SODIUM SERPL-SCNC: 139 MMOL/L — SIGNIFICANT CHANGE UP (ref 135–146)
WBC # BLD: 9.04 K/UL — SIGNIFICANT CHANGE UP (ref 4.8–10.8)
WBC # FLD AUTO: 9.04 K/UL — SIGNIFICANT CHANGE UP (ref 4.8–10.8)

## 2021-01-12 PROCEDURE — 99239 HOSP IP/OBS DSCHRG MGMT >30: CPT

## 2021-01-12 RX ORDER — FUROSEMIDE 40 MG
20 TABLET ORAL DAILY
Refills: 0 | Status: DISCONTINUED | OUTPATIENT
Start: 2021-01-12 | End: 2021-01-12

## 2021-01-12 RX ORDER — METOPROLOL TARTRATE 50 MG
1 TABLET ORAL
Qty: 60 | Refills: 0
Start: 2021-01-12 | End: 2021-02-10

## 2021-01-12 RX ORDER — RAMIPRIL 5 MG
1 CAPSULE ORAL
Qty: 0 | Refills: 0 | DISCHARGE

## 2021-01-12 RX ORDER — LISINOPRIL 2.5 MG/1
5 TABLET ORAL DAILY
Refills: 0 | Status: DISCONTINUED | OUTPATIENT
Start: 2021-01-12 | End: 2021-01-12

## 2021-01-12 RX ORDER — METOPROLOL TARTRATE 50 MG
50 TABLET ORAL
Refills: 0 | Status: DISCONTINUED | OUTPATIENT
Start: 2021-01-12 | End: 2021-01-12

## 2021-01-12 RX ORDER — LISINOPRIL 2.5 MG/1
1 TABLET ORAL
Qty: 30 | Refills: 2
Start: 2021-01-12 | End: 2021-04-11

## 2021-01-12 RX ORDER — APIXABAN 2.5 MG/1
1 TABLET, FILM COATED ORAL
Qty: 60 | Refills: 0
Start: 2021-01-12 | End: 2021-02-10

## 2021-01-12 RX ORDER — DILTIAZEM HCL 120 MG
1 CAPSULE, EXT RELEASE 24 HR ORAL
Qty: 30 | Refills: 3
Start: 2021-01-12 | End: 2021-05-11

## 2021-01-12 RX ORDER — METOPROLOL TARTRATE 50 MG
1 TABLET ORAL
Qty: 0 | Refills: 0 | DISCHARGE

## 2021-01-12 RX ORDER — FUROSEMIDE 40 MG
1 TABLET ORAL
Qty: 30 | Refills: 3
Start: 2021-01-12 | End: 2021-05-11

## 2021-01-12 RX ORDER — DILTIAZEM HCL 120 MG
120 CAPSULE, EXT RELEASE 24 HR ORAL DAILY
Refills: 0 | Status: DISCONTINUED | OUTPATIENT
Start: 2021-01-12 | End: 2021-01-12

## 2021-01-12 RX ADMIN — Medication 50 MILLIGRAM(S): at 00:29

## 2021-01-12 RX ADMIN — CHLORHEXIDINE GLUCONATE 1 APPLICATION(S): 213 SOLUTION TOPICAL at 05:39

## 2021-01-12 RX ADMIN — Medication 30 MILLIGRAM(S): at 05:38

## 2021-01-12 RX ADMIN — Medication 50 MILLIGRAM(S): at 05:38

## 2021-01-12 RX ADMIN — Medication 5 MILLIGRAM(S): at 05:39

## 2021-01-12 RX ADMIN — APIXABAN 2.5 MILLIGRAM(S): 2.5 TABLET, FILM COATED ORAL at 05:40

## 2021-01-12 NOTE — DISCHARGE NOTE PROVIDER - NSDCCPCAREPLAN_GEN_ALL_CORE_FT
PRINCIPAL DISCHARGE DIAGNOSIS  Diagnosis: Atrial flutter  Assessment and Plan of Treatment: You presented to the hospital with an arrhythmia, atrial flutter. You were treated with medications to slow your heart rate. You were also placed on a blood thinner since an arrythmia may increase your risk for stroke. However, there is an increased risk of bleeding while on a blood thinner. Please take your medications as prescribed and follow-up with your doctors.      SECONDARY DISCHARGE DIAGNOSES  Diagnosis: Rash  Assessment and Plan of Treatment: You presented with a rash. It is likely secondary to an allergic reaction. You were treated with antihistamines.  Allergic Reaction  An allergic reaction is an abnormal reaction to a substance (allergen) by the body's defense system. Common allergens include medicines, food, insect bites or stings, and blood products. The body releases certain proteins into the blood that can cause a variety of symptoms such as an itchy rash, wheezing, swelling of the face/lips/tongue/throat, abdominal pain, nausea or vomiting. An allergic reaction is usually treated with medication. If your health care provider prescribed you an epinephrine injection device, make sure to keep it with you at all times.  SEEK IMMEDIATE MEDICAL CARE IF YOU HAVE ANY OF THE FOLLOWING SYMPTOMS: allergic reaction severe enough that required you to use epinephrine, tightness in your chest, swelling around your lips/tongue/throat, abdominal pain, vomiting or diarrhea, or lightheadedness/dizziness. These symptoms may represent a serious problem that is an emergency. Do not wait to see if the symptoms will go away. Use your auto-injector pen or anaphylaxis kit as you have been instructed. Call 911 and do not drive yourself to the hospital.

## 2021-01-12 NOTE — PROGRESS NOTE ADULT - ASSESSMENT
Afib RVR: controlled now on lopressor 50 QID. added cardizem 30 q8h on 1/10/21 . Discussed benefits of starting anticoagulation to prevent strokes from Afib/Aflutter and risks involved in starting anticoagulantion including risk of bleeding, hemorrhagic stroke, GI bleed and even death. Son Henrik agreed to start anticoagulation given benifits outweighs risk. and he said that she will have 24 hour supervision care at home     CT chest showed:     1. Cardiomegaly with small to moderate size bilateral pleural effusions. Small pericardial effusion.    2. Bilateral upper lobe peripheral predominant parenchymal groundglass consolidations, left greater than right. Findings are nonspecific and may reflect volume overload as well as infectious/inflammatory etiology.    3. Nondisplaced fractures anterior right 6th, 7th, 8th, 9th and 10th ribs of indeterminate chronicity.    CT scan findings could be related to CHF. will check bnp. on clinical exam patient crespo snot look in fluid overload. if BP ok later today then can add lasix 20 mg qday. will get pulm eval for CT chest findings     s/p fall prior to admission : cth and neck no acute fractures. hip xray no fracture     rash improved: avoid benadryl making patient lethargic. monitor skin rash     dementia history patient was alert and oriented times 4     HTN: on low side this morning will d/c lisinopril 40 . on lopressor 50 q6h and on lisinopril 40 qday on cardizem 30 q8h  rate control. follow BP today     DNR/DNI : molst form  in chart     discharge pending pulm eval. monitor BP in the next 24 hours. son planning to get 24 hour care for patient at home.  will contact son today. possible discharge in 24 hours              89 y/o woman with PMH of HTN, dementia and urinary incontinence presented with pruritic rash and urinary frequency.     1. Afib with RVR: controlled now on lopressor 50 QID and cardizem 30 q8h   - change to lopressor 50mg q12hr and cardizem CD 120mg daily on discharge  - previous hospitalist discussed benefits of starting anticoagulation to prevent strokes from Afib/Aflutter and risks involved in starting anticoagulation including risk of bleeding, hemorrhagic stroke, GI bleed and even death. Son Henrik agreed to start anticoagulation given benefits outweighs risk and he said that she will have 24 hour supervision care at home  - outpt f/u with cardiology    2. Likely acute HFpEF - continue PO lasix for diuresis  pt stable on room air  pulm agrees with diuresis    3. s/p fall - CT scan of head and neck and xray of pelvis - negative for acute pathology    4. Rash - now resolved per staff    5. HTN - will be discharged home on lopressor 50mg q12hr, lisinopril 5mg daily, cardizem CD 120mg daily and lasix 20mg daily  low sodium diet  outpt f/u     DNR/DNI status    Medication reconciliation reviewed with the resident  Discharge papers reviewed    Spent 45 minutes on the discharge process of this pt

## 2021-01-12 NOTE — DISCHARGE NOTE PROVIDER - NSDCMRMEDTOKEN_GEN_ALL_CORE_FT
apixaban 2.5 mg oral tablet: 1 tab(s) orally 2 times a day  Cardizem  mg/24 hours oral capsule, extended release: 1 cap(s) orally once a day   furosemide 20 mg oral tablet: 1 tab(s) orally once a day  lisinopril 5 mg oral tablet: 1 tab(s) orally once a day  metoprolol tartrate 50 mg oral tablet: 1 tab(s) orally 2 times a day  oxybutynin 5 mg oral tablet: 1 tab(s) orally 2 times a day

## 2021-01-12 NOTE — DISCHARGE NOTE PROVIDER - CARE PROVIDER_API CALL
Yohana Mayen)  Family Medicine  [address] [place] [address]  Phone: [phone]  Fax: [phone]  Follow Up Time:     Garrett Awan  Cardiovascular Disease  [address] [place] [address], [place] [address]  Phone: [phone]  Fax: [phone]  Follow Up Time:

## 2021-01-12 NOTE — PROGRESS NOTE ADULT - REASON FOR ADMISSION
rash, increased urine frequency

## 2021-01-12 NOTE — PROGRESS NOTE ADULT - PROVIDER SPECIALTY LIST ADULT
Hospitalist
Cardiology
Internal Medicine

## 2021-01-12 NOTE — DISCHARGE NOTE PROVIDER - HOSPITAL COURSE
89 y/o female h/o HTN, urinary incontinence, dementia (baseline around AAx2) presented to ED for pruritic rash , increased urinary frequency and friend unable to take care at home. In ED EKG revealed a flutter with AV block which is new. Pt had no past history of arrhythmia. S/p 40mg of  lovenox in ED, 1l LR bolus. Pt placed on lopressor 50mg BID. ECHO EF 61%,  Mildly decreased global left ventricular systolic function. Mildly decreased segmental left ventricular systolic function. TSH 3.35. Pulmonology and cardiology following. CT chest: B/L pleural effusions, moderate groundglass opacities L>R. BNP 2000. Pt on DVT ppx for new onset aflutter. Pruritic rash likely allergic/IgE mediated reaction, resolved w/ benadryl.  Pt cleared for d/c to home, plan for nursing services to be set up by patients son. 89 y/o female h/o HTN, urinary incontinence, dementia (baseline around AAx2) presented to ED for pruritic rash , increased urinary frequency and friend unable to take care at home. In ED EKG revealed a flutter with AV block which is new. Pt had no past history of arrhythmia. S/p 40mg of  lovenox in ED, 1l LR bolus. Pt placed on lopressor 50mg BID. ECHO EF 61%,  Mildly decreased global left ventricular systolic function. Mildly decreased segmental left ventricular systolic function. TSH 3.35. Pulmonology and cardiology following. CT chest: B/L pleural effusions, moderate groundglass opacities L>R. BNP 2000. Pt on DVT ppx for new onset aflutter. Pruritic rash likely allergic/IgE mediated reaction, resolved w/ benadryl.  Pt cleared for d/c to home.

## 2021-01-14 LAB
CULTURE RESULTS: SIGNIFICANT CHANGE UP
SPECIMEN SOURCE: SIGNIFICANT CHANGE UP

## 2021-01-19 DIAGNOSIS — I50.21 ACUTE SYSTOLIC (CONGESTIVE) HEART FAILURE: ICD-10-CM

## 2021-01-19 DIAGNOSIS — F03.90 UNSPECIFIED DEMENTIA WITHOUT BEHAVIORAL DISTURBANCE: ICD-10-CM

## 2021-01-19 DIAGNOSIS — Z66 DO NOT RESUSCITATE: ICD-10-CM

## 2021-01-19 DIAGNOSIS — I48.91 UNSPECIFIED ATRIAL FIBRILLATION: ICD-10-CM

## 2021-01-19 DIAGNOSIS — E88.09 OTHER DISORDERS OF PLASMA-PROTEIN METABOLISM, NOT ELSEWHERE CLASSIFIED: ICD-10-CM

## 2021-01-19 DIAGNOSIS — R26.9 UNSPECIFIED ABNORMALITIES OF GAIT AND MOBILITY: ICD-10-CM

## 2021-01-19 DIAGNOSIS — R21 RASH AND OTHER NONSPECIFIC SKIN ERUPTION: ICD-10-CM

## 2021-01-19 DIAGNOSIS — I44.30 UNSPECIFIED ATRIOVENTRICULAR BLOCK: ICD-10-CM

## 2021-01-19 DIAGNOSIS — R39.81 FUNCTIONAL URINARY INCONTINENCE: ICD-10-CM

## 2021-01-19 DIAGNOSIS — I11.0 HYPERTENSIVE HEART DISEASE WITH HEART FAILURE: ICD-10-CM

## 2021-01-19 DIAGNOSIS — I34.0 NONRHEUMATIC MITRAL (VALVE) INSUFFICIENCY: ICD-10-CM

## 2021-01-19 DIAGNOSIS — M84.48XA PATHOLOGICAL FRACTURE, OTHER SITE, INITIAL ENCOUNTER FOR FRACTURE: ICD-10-CM

## 2021-01-19 DIAGNOSIS — L30.9 DERMATITIS, UNSPECIFIED: ICD-10-CM

## 2021-01-19 DIAGNOSIS — I48.92 UNSPECIFIED ATRIAL FLUTTER: ICD-10-CM

## 2021-01-19 DIAGNOSIS — L29.9 PRURITUS, UNSPECIFIED: ICD-10-CM

## 2021-06-11 PROBLEM — R32 UNSPECIFIED URINARY INCONTINENCE: Chronic | Status: ACTIVE | Noted: 2021-01-07

## 2021-06-11 PROBLEM — F03.90 UNSPECIFIED DEMENTIA WITHOUT BEHAVIORAL DISTURBANCE: Chronic | Status: ACTIVE | Noted: 2021-01-07

## 2021-06-18 ENCOUNTER — APPOINTMENT (OUTPATIENT)
Dept: GERIATRICS | Facility: HOME HEALTH | Age: 86
End: 2021-06-18
Payer: MEDICARE

## 2021-06-18 VITALS
DIASTOLIC BLOOD PRESSURE: 62 MMHG | SYSTOLIC BLOOD PRESSURE: 110 MMHG | OXYGEN SATURATION: 98 % | RESPIRATION RATE: 16 BRPM | HEART RATE: 60 BPM | TEMPERATURE: 97.6 F

## 2021-06-18 DIAGNOSIS — R60.0 LOCALIZED EDEMA: ICD-10-CM

## 2021-06-18 DIAGNOSIS — R26.81 UNSTEADINESS ON FEET: ICD-10-CM

## 2021-06-18 PROCEDURE — 99348 HOME/RES VST EST LOW MDM 30: CPT

## 2021-06-18 RX ORDER — ADHESIVE TAPE 3"X 2.3 YD
50 MCG TAPE, NON-MEDICATED TOPICAL
Refills: 0 | Status: DISCONTINUED | COMMUNITY
End: 2021-06-18

## 2021-06-18 RX ORDER — FUROSEMIDE 20 MG/1
20 TABLET ORAL
Refills: 0 | Status: ACTIVE | COMMUNITY

## 2021-06-18 RX ORDER — TRAZODONE HYDROCHLORIDE 50 MG/1
50 TABLET ORAL
Qty: 90 | Refills: 3 | Status: ACTIVE | COMMUNITY
Start: 2021-06-18 | End: 1900-01-01

## 2021-06-18 RX ORDER — RAMIPRIL 10 MG/1
10 CAPSULE ORAL DAILY
Refills: 0 | Status: DISCONTINUED | COMMUNITY
End: 2021-06-18

## 2021-06-18 RX ORDER — LISINOPRIL 5 MG/1
5 TABLET ORAL
Refills: 0 | Status: ACTIVE | COMMUNITY

## 2021-06-18 RX ORDER — DILTIAZEM HYDROCHLORIDE 120 MG/1
120 CAPSULE, COATED, EXTENDED RELEASE ORAL
Refills: 0 | Status: ACTIVE | COMMUNITY

## 2021-06-18 RX ORDER — OXYBUTYNIN CHLORIDE 5 MG/1
5 TABLET ORAL
Qty: 60 | Refills: 5 | Status: DISCONTINUED | COMMUNITY
Start: 2020-05-30 | End: 2021-06-18

## 2021-06-18 RX ORDER — APIXABAN 2.5 MG/1
2.5 TABLET, FILM COATED ORAL
Refills: 0 | Status: ACTIVE | COMMUNITY

## 2021-06-18 NOTE — PHYSICAL EXAM
[General Appearance - Alert] : alert [General Appearance - In No Acute Distress] : in no acute distress [General Appearance - Well Nourished] : well nourished [General Appearance - Well Developed] : well developed [Sclera] : the sclera and conjunctiva were normal [Normal Oral Mucosa] : normal oral mucosa [No Oral Pallor] : no oral pallor [No Oral Cyanosis] : no oral cyanosis [Outer Ear] : the ears and nose were normal in appearance [Hearing Threshold Finger Rub Not Gove] : hearing was normal [Examination Of The Oral Cavity] : the lips and gums were normal [Neck Cervical Mass (___cm)] : no neck mass was observed [Jugular Venous Distention Increased] : there was no jugular-venous distention [Respiration, Rhythm And Depth] : normal respiratory rhythm and effort [Exaggerated Use Of Accessory Muscles For Inspiration] : no accessory muscle use [Auscultation Breath Sounds / Voice Sounds] : lungs were clear to auscultation bilaterally [Heart Sounds] : normal S1 and S2 [Abdomen Soft] : soft [Abdomen Tenderness] : non-tender [No Spinal Tenderness] : no spinal tenderness [Nail Clubbing] : no clubbing  or cyanosis of the fingernails [] : no rash [Involuntary Movements] : no involuntary movements were seen [Skin Lesions] : no skin lesions [Cranial Nerves] : cranial nerves 2-12 were intact [No Focal Deficits] : no focal deficits [Oriented To Time, Place, And Person] : oriented to person, place, and time [Impaired Insight] : insight and judgment were intact [FreeTextEntry1] : 1-2+ LLE pitting edema

## 2021-06-18 NOTE — ASSESSMENT
[FreeTextEntry1] : LLE edema\par - check BLE venous dopper\par - unknown duration, reports it started at least back in March\par \par SDAT/Insomnia\par - at baseline\par - c/w current Rx\par - not following with neuro\par - will try trazodone 50mg PO QHS\par \par New AFIB\par - + rate control\par - c/w DOAC\par - outpatient Cards FU as scheduled\par \par HTN\par - b/p stable\par - c/w current medications\par - low Na+ diet\par \par RA/Gait instability\par - DME for ambulation, using cane/walker. no recent falls\par - Outpatient FU with Rheumatology as scheduled (Dr. Sadler)\par - Not currently on any medications, using APAP PRN for pain\par - PT/OT c/s\par \par Constipation\par - Colace/Senna PRN\par - MiraLAX QD PRN\par \par Vitamin D Deficiency\par - c/w PO Supplement \par

## 2021-06-18 NOTE — HISTORY OF PRESENT ILLNESS
[FreeTextEntry1] : Patient seen and examined at home.  Patient is homebound 2/2 generalized weakness/debility.  HHa and son present during visit.  All report patient at baseline.  No acute complaints.  No CP/SOB. No abdominal complaints with good appetite and regular BM's.  No urinary complaints.  No recent falls.  No skin wounds.  Family reporting patient diagnosed with AD and AFIB, started on Eliquis 2.5mg.

## 2021-07-29 ENCOUNTER — APPOINTMENT (OUTPATIENT)
Dept: GERIATRICS | Facility: HOME HEALTH | Age: 86
End: 2021-07-29
Payer: MEDICARE

## 2021-07-29 VITALS
TEMPERATURE: 98.2 F | OXYGEN SATURATION: 97 % | SYSTOLIC BLOOD PRESSURE: 104 MMHG | RESPIRATION RATE: 18 BRPM | DIASTOLIC BLOOD PRESSURE: 58 MMHG | HEART RATE: 80 BPM

## 2021-07-29 DIAGNOSIS — R25.1 TREMOR, UNSPECIFIED: ICD-10-CM

## 2021-07-29 PROCEDURE — 99348 HOME/RES VST EST LOW MDM 30: CPT

## 2021-07-29 NOTE — PHYSICAL EXAM
[General Appearance - Alert] : alert [General Appearance - In No Acute Distress] : in no acute distress [General Appearance - Well Nourished] : well nourished [General Appearance - Well Developed] : well developed [Sclera] : the sclera and conjunctiva were normal [Neck Cervical Mass (___cm)] : no neck mass was observed [Jugular Venous Distention Increased] : there was no jugular-venous distention [Respiration, Rhythm And Depth] : normal respiratory rhythm and effort [Exaggerated Use Of Accessory Muscles For Inspiration] : no accessory muscle use [Auscultation Breath Sounds / Voice Sounds] : lungs were clear to auscultation bilaterally [Heart Sounds] : normal S1 and S2 [Abdomen Soft] : soft [Abdomen Tenderness] : non-tender [No Spinal Tenderness] : no spinal tenderness [Nail Clubbing] : no clubbing  or cyanosis of the fingernails [Involuntary Movements] : no involuntary movements were seen [] : no rash [Skin Lesions] : no skin lesions [Cranial Nerves] : cranial nerves 2-12 were intact [No Focal Deficits] : no focal deficits [Oriented To Time, Place, And Person] : oriented to person, place, and time [Impaired Insight] : insight and judgment were intact [FreeTextEntry1] : No tremor noted to LUE at rest.  Possibly a marginal tremor noted when patient is reaching for objects

## 2021-07-29 NOTE — HISTORY OF PRESENT ILLNESS
[FreeTextEntry1] : Patient seen and examined at home.  Patient is homebound 2/2 AD.  HHA present during visit.  spoke w/son Henrik via phone.  HHA had reported a tremor approx 1-2 weeks ago.  The tremor is in the LUE specifically the hand.  The episodes last approx 2-5 minutes and resolve completely.  No other focal symptoms.  It happens with motion and not at rest.  No other complaints.  No recent falls.

## 2021-07-29 NOTE — ASSESSMENT
[FreeTextEntry1] : LUE/Hand tremor, likely essential tremor\par - at this time there is no disability and the symptoms are mild no pharmacologic intervention is indicated at this time\par - will continue to monitor and advised for son and HHA to call if symptoms worsen and the patient begins to experience any type of disability.  If this dose occur, will initiate Rx of propranolol.  \par

## 2021-09-16 NOTE — ED PROVIDER NOTE - PMH
Dementia    HTN (hypertension)     Continue Regimen: Bactrim DS BID 7 days left. Given by PCP Detail Level: Zone Render In Strict Bullet Format?: No

## 2021-12-02 ENCOUNTER — APPOINTMENT (OUTPATIENT)
Dept: GERIATRICS | Facility: HOME HEALTH | Age: 86
End: 2021-12-02
Payer: MEDICARE

## 2021-12-02 VITALS
HEART RATE: 78 BPM | DIASTOLIC BLOOD PRESSURE: 62 MMHG | SYSTOLIC BLOOD PRESSURE: 110 MMHG | TEMPERATURE: 97.8 F | RESPIRATION RATE: 16 BRPM | OXYGEN SATURATION: 98 %

## 2021-12-02 DIAGNOSIS — E55.9 VITAMIN D DEFICIENCY, UNSPECIFIED: ICD-10-CM

## 2021-12-02 DIAGNOSIS — H93.8X2 OTHER SPECIFIED DISORDERS OF LEFT EAR: ICD-10-CM

## 2021-12-02 PROCEDURE — 99348 HOME/RES VST EST LOW MDM 30: CPT

## 2021-12-02 NOTE — HISTORY OF PRESENT ILLNESS
[FreeTextEntry1] : Patient seen and examined at home.  Patient is homebound 2/2 generalized weakness/debility and AD.  HHA present during visit, spoke w/Son Henrik via phone.  All report patient at baseline.  No acute complaints.  No CP/SOB. No abdominal complaints with good appetite and regular BM's.  No urinary complaints.  No recent falls.  No skin wounds.  Reporting that patient bumped her head approx 5 days ago, has left ear swelling.  Went to ENT ystd and had it drained.  FU again next week.  + pain.  On ABX.  No further tremors noted to LUE

## 2021-12-02 NOTE — ASSESSMENT
[FreeTextEntry1] : Left ear swelling s/p trauma\par - s/p I&D by ENT ystd\par - local wound care\par - complete ABX\par - ENT Fu as scheduled\par \par LUE/Hand tremor\par - likely ET\par - much better controlled, monitor off meds\par \par SDAT/Insomnia\par - at baseline\par - c/w current Rx\par - not following with neuro\par - c/w trazodone 50mg PO QHS\par \par New AFIB\par - + rate control\par - c/w DOAC\par - outpatient Cards FU as scheduled\par \par HTN\par - b/p stable\par - c/w current medications\par - low Na+ diet\par \par RA/Gait instability\par - DME for ambulation, using cane/walker. no recent falls\par - Outpatient FU with Rheumatology as scheduled (Dr. Sadler)\par - Not currently on any medications, using APAP PRN for pain\par - PT/OT c/s\par \par Constipation\par - Colace/Senna PRN\par - MiraLAX QD PRN\par \par Vitamin D Deficiency\par - c/w PO Supplement \par

## 2021-12-02 NOTE — PHYSICAL EXAM
[Alert] : alert [No Acute Distress] : in no acute distress [Normal Outer Ear/Nose] : the ears and nose were normal in appearance [Normal Appearance] : the appearance of the neck was normal [Supple] : the neck was supple [No Respiratory Distress] : no respiratory distress [No Acc Muscle Use] : no accessory muscle use [Respiration, Rhythm And Depth] : normal respiratory rhythm and effort [Auscultation Breath Sounds / Voice Sounds] : lungs were clear to auscultation bilaterally [Normal S1, S2] : normal S1 and S2 [Heart Rate And Rhythm] : heart rate was normal and rhythm regular [Edema] : edema was not present [Abdomen Tenderness] : non-tender [Abdomen Soft] : soft [No Spinal Tenderness] : no spinal tenderness [No Clubbing, Cyanosis] : no clubbing or cyanosis of the fingernails [Involuntary Movements] : no involuntary movements were seen [] : no rash [Skin Lesions] : no skin lesions [No Focal Deficits] : no focal deficits [FreeTextEntry1] : + swelling to cartilage of left ear with I&D incision visible.  not actively training.  +TTP [de-identified] : Confused

## 2022-02-09 ENCOUNTER — APPOINTMENT (OUTPATIENT)
Dept: GERIATRICS | Facility: HOME HEALTH | Age: 87
End: 2022-02-09
Payer: MEDICARE

## 2022-02-09 VITALS
TEMPERATURE: 98.5 F | OXYGEN SATURATION: 97 % | DIASTOLIC BLOOD PRESSURE: 52 MMHG | RESPIRATION RATE: 18 BRPM | SYSTOLIC BLOOD PRESSURE: 98 MMHG | HEART RATE: 80 BPM

## 2022-02-09 DIAGNOSIS — I48.91 UNSPECIFIED ATRIAL FIBRILLATION: ICD-10-CM

## 2022-02-09 DIAGNOSIS — F02.80 ALZHEIMER'S DISEASE WITH LATE ONSET: ICD-10-CM

## 2022-02-09 DIAGNOSIS — I63.9 CEREBRAL INFARCTION, UNSPECIFIED: ICD-10-CM

## 2022-02-09 DIAGNOSIS — M06.9 RHEUMATOID ARTHRITIS, UNSPECIFIED: ICD-10-CM

## 2022-02-09 DIAGNOSIS — I10 ESSENTIAL (PRIMARY) HYPERTENSION: ICD-10-CM

## 2022-02-09 DIAGNOSIS — G30.1 ALZHEIMER'S DISEASE WITH LATE ONSET: ICD-10-CM

## 2022-02-09 PROCEDURE — 99348 HOME/RES VST EST LOW MDM 30: CPT

## 2022-02-09 NOTE — HISTORY OF PRESENT ILLNESS
[FreeTextEntry1] : Patient seen and examined at home.  Patient is homebound 2/2 generalized weakness/debility and AD.  As soon as I came into the home I noticed that she had left side facial droop.  HHA reports a change in mental status x 1 week, her speech not making sense and it seems like her left arm is paralyzed as well as her left leg.  I immediately called the son and we spoke, he did not want to activate EMS at the time of visit.

## 2022-02-09 NOTE — ASSESSMENT
[FreeTextEntry1] : The patient likely had some type of neurological event, CVA.  The patient's son did NOT want to activate EMS at this time.  he asked about blood work and I said it would be unrevealing at this time and that if he wanted her treated she would need to go to the hospital.  We spoke about alternatives to hospitalization, specifically hospice, as the only other viable alternative he was not sure and stated he wanted to speak with his wife and the rest of his family before making a decision.  At this time there is no further Rx I can offer, and I again advised activation of EMS if they do decide they want to treat her.  Her BP is low at this time, I advised to hold the lisinopril and c/w other meds as ordered.  They verbalized understanding.  He reports he would let our office know his decision

## 2022-02-09 NOTE — PHYSICAL EXAM
[Alert] : alert [Sclera] : the sclera and conjunctiva were normal [Normal Outer Ear/Nose] : the ears and nose were normal in appearance [Normal Appearance] : the appearance of the neck was normal [Supple] : the neck was supple [No Respiratory Distress] : no respiratory distress [No Acc Muscle Use] : no accessory muscle use [Respiration, Rhythm And Depth] : normal respiratory rhythm and effort [Auscultation Breath Sounds / Voice Sounds] : lungs were clear to auscultation bilaterally [Normal S1, S2] : normal S1 and S2 [Heart Rate And Rhythm] : heart rate was normal and rhythm regular [Edema] : edema was not present [Abdomen Tenderness] : non-tender [Abdomen Soft] : soft [No Spinal Tenderness] : no spinal tenderness [No Clubbing, Cyanosis] : no clubbing or cyanosis of the fingernails [Involuntary Movements] : no involuntary movements were seen [] : no rash [Skin Lesions] : no skin lesions [de-identified] : + left sided facial droop and left arm paralysis.  LLE w/3/4 str.  + dysarthria [de-identified] : Confused

## 2022-02-13 ENCOUNTER — NON-APPOINTMENT (OUTPATIENT)
Age: 87
End: 2022-02-13

## 2022-05-11 NOTE — PROGRESS NOTE ADULT - SUBJECTIVE AND OBJECTIVE BOX
CRISTHIANGABRIELE  89y  Female      Patient is a 89y old  Female who presents with a chief complaint of Atrial flutter, rash, increased urine frequency (08 Jan 2021 13:36)    REVIEW OF SYSTEMS:  CONSTITUTIONAL: No fever, weight loss, or fatigue  EYES: No eye pain, visual disturbances, or discharge  ENMT:  No difficulty hearing, tinnitus, vertigo; No sinus or throat pain  NECK: No pain or stiffness  BREASTS: No pain, masses, or nipple discharge  RESPIRATORY: No cough, wheezing, chills or hemoptysis; No shortness of breath  CARDIOVASCULAR: No chest pain, palpitations, dizziness, or leg swelling  GASTROINTESTINAL: No abdominal or epigastric pain. No nausea, vomiting, or hematemesis; No diarrhea or constipation. No melena or hematochezia.  GENITOURINARY: No dysuria, frequency, hematuria, or incontinence  NEUROLOGICAL: No headaches, memory loss, loss of strength, numbness, or tremors  SKIN: No itching, burning, rashes, or lesions   LYMPH NODES: No enlarged glands  ENDOCRINE: No heat or cold intolerance; No hair loss  MUSCULOSKELETAL: No joint pain or swelling; No muscle, back, or extremity pain  PSYCHIATRIC: No depression, anxiety, mood swings, or difficulty sleeping  HEME/LYMPH: No easy bruising, or bleeding gums  ALLERY AND IMMUNOLOGIC: No hives or eczema  FAMILY HISTORY:  No pertinent family history in first degree relatives      ICU Vital Signs Last 24 Hrs  T(C): 37.3 (08 Jan 2021 13:51), Max: 37.3 (08 Jan 2021 13:51)  T(F): 99.2 (08 Jan 2021 13:51), Max: 99.2 (08 Jan 2021 13:51)  HR: 95 (08 Jan 2021 13:51) (62 - 144)  BP: 140/98 (08 Jan 2021 13:51) (120/62 - 187/75)  RR: 18 (08 Jan 2021 13:51) (18 - 18)  SpO2: 95% (08 Jan 2021 09:25) (95% - 97%)    PHYSICAL EXAM:  GENERAL: NAD, well-groomed, appears stated age  HEAD:  Atraumatic, Normocephalic  NERVOUS SYSTEM:  Alert & Oriented X2, Good concentration  CHEST/LUNG: Clear to percussion bilaterally; No rales, rhonchi, wheezing, or rubs  HEART: Regular rate and rhythm; No murmurs, rubs, or gallops  ABDOMEN: Soft, Nontender, Nondistended; Bowel sounds present  SKIN: erythematous, pruritic rash, generalized    Consultant(s) Notes Reviewed:  [x ] YES  [ ] NO  Care Discussed with Consultants/Other Providers [ x] YES  [ ] NO    LABS:    RADIOLOGY & ADDITIONAL TESTS:    Imaging Personally Reviewed:  [ ] YES  [ ] NO  chlorhexidine 4% Liquid 1 Application(s) Topical <User Schedule>  diphenhydrAMINE 25 milliGRAM(s) Oral every 8 hours  enoxaparin Injectable 50 milliGRAM(s) SubCutaneous two times a day  lisinopril 40 milliGRAM(s) Oral daily  metoprolol tartrate 50 milliGRAM(s) Oral three times a day  oxybutynin 5 milliGRAM(s) Oral two times a day      HEALTH ISSUES - PROBLEM Dx:        
  KADE MORROWINE  89y  Female      Patient is a 89y old  Female who presents with a chief complaint of Atrial flutter, rash, increased urine frequency (08 Jan 2021 13:36)    REVIEW OF SYSTEMS:  CONSTITUTIONAL: No fever, weight loss, or fatigue  EYES: No eye pain, visual disturbances, or discharge  ENMT:  No difficulty hearing, tinnitus, vertigo; No sinus or throat pain  NECK: No pain or stiffness  BREASTS: No pain, masses, or nipple discharge  RESPIRATORY: No cough, wheezing, chills or hemoptysis; No shortness of breath  CARDIOVASCULAR: No chest pain, palpitations, dizziness, or leg swelling  GASTROINTESTINAL: No abdominal or epigastric pain. No nausea, vomiting, or hematemesis; No diarrhea or constipation. No melena or hematochezia.  GENITOURINARY: No dysuria, frequency, hematuria, or incontinence  NEUROLOGICAL: No headaches, memory loss, loss of strength, numbness, or tremors  SKIN: No itching, burning, rashes, or lesions   LYMPH NODES: No enlarged glands  ENDOCRINE: No heat or cold intolerance; No hair loss  MUSCULOSKELETAL: No joint pain or swelling; No muscle, back, or extremity pain  PSYCHIATRIC: No depression, anxiety, mood swings, or difficulty sleeping  HEME/LYMPH: No easy bruising, or bleeding gums  ALLERY AND IMMUNOLOGIC: No hives or eczema  FAMILY HISTORY:  No pertinent family history in first degree relatives      ICU Vital Signs Last 24 Hrs  ICU Vital Signs Last 24 Hrs  T(C): 36.3 (11 Jan 2021 05:15), Max: 37.1 (10 Sean 2021 20:17)  T(F): 97.3 (11 Jan 2021 05:15), Max: 98.8 (10 Sean 2021 20:17)  HR: 73 (11 Jan 2021 11:36) (68 - 92)  BP: 152/95 (11 Jan 2021 11:36) (104/78 - 167/82)  RR: 19 (11 Jan 2021 05:15) (19 - 19)  SpO2: 94% (11 Jan 2021 07:57) (94% - 94%)      PHYSICAL EXAM:  GENERAL: NAD, well-groomed, appears stated age  HEAD:  Atraumatic, Normocephalic  NERVOUS SYSTEM:  Alert & Oriented X2, Good concentration  CHEST/LUNG: Clear to percussion bilaterally; No rales, rhonchi, wheezing, or rubs  HEART: Regular rate and rhythm; No murmurs, rubs, or gallops  ABDOMEN: Soft, Nontender, Nondistended; Bowel sounds present  SKIN: erythematous, pruritic rash, generalized    Consultant(s) Notes Reviewed:  [x ] YES  [ ] NO  Care Discussed with Consultants/Other Providers [ x] YES  [ ] NO    LABS:    RADIOLOGY & ADDITIONAL TESTS:    Imaging Personally Reviewed:  [ ] YES  [ ] NO  chlorhexidine 4% Liquid 1 Application(s) Topical <User Schedule>  diphenhydrAMINE 25 milliGRAM(s) Oral every 8 hours  enoxaparin Injectable 50 milliGRAM(s) SubCutaneous two times a day  lisinopril 40 milliGRAM(s) Oral daily  metoprolol tartrate 50 milliGRAM(s) Oral three times a day  oxybutynin 5 milliGRAM(s) Oral two times a day      HEALTH ISSUES - PROBLEM Dx:        
[de-identified] : Mr. Woodson returns to the office for a follow-up visit today.  He is here to review his MRI imaging that he brought.  Last time, he only had the MRI report.  He continues to have more right-sided neck pain as well as left arm pain.
patient has no chest pain   no sob   rash improving   HR controlled on tele     Vital Signs Last 24 Hrs  T(C): 36.3 (11 Jan 2021 05:15), Max: 37.1 (10 Sean 2021 20:17)  T(F): 97.3 (11 Jan 2021 05:15), Max: 98.8 (10 Sean 2021 20:17)  HR: 68 (11 Jan 2021 07:57) (68 - 92)  BP: 104/78 (11 Jan 2021 07:57) (104/78 - 167/82)  BP(mean): --  RR: 19 (11 Jan 2021 05:15) (19 - 19)  SpO2: 94% (11 Jan 2021 07:57) (94% - 94%)    PHYSICAL EXAM:  GENERAL: NAD,   CHEST/LUNG: Clear to auscultation bilaterally; No wheeze  HEART: IRRegular rate and rhythm; No murmurs, rubs, or gallops  GI: Soft, Nontender, Nondistended; Bowel sounds present  EXTREMITIES:  2+ Peripheral Pulses, No clubbing, cyanosis, or edema  PSYCH: AAOx3  NEUROLOGY: non-focal                          12.5   8.06  )-----------( 208      ( 11 Jan 2021 04:30 )             36.8     01-11    140  |  105  |  18  ----------------------------<  94  3.3<L>   |  25  |  0.9    Ca    8.6      11 Jan 2021 04:30  Mg     2.1     01-11    TPro  6.2  /  Alb  3.4<L>  /  TBili  0.9  /  DBili  x   /  AST  19  /  ALT  10  /  AlkPhos  139<H>  01-11    LIVER FUNCTIONS - ( 11 Jan 2021 04:30 )  Alb: 3.4 g/dL / Pro: 6.2 g/dL / ALK PHOS: 139 U/L / ALT: 10 U/L / AST: 19 U/L / GGT: x                   Culture - Blood (collected 08 Jan 2021 23:30)  Source: .Blood Blood  Preliminary Report (10 Sean 2021 07:02):    No growth to date.    Culture - Urine (collected 08 Jan 2021 20:11)  Source: .Urine Catheterized  Final Report (09 Jan 2021 22:17):    <10,000 CFU/mL Normal Urogenital Jenny    MEDICATIONS  (STANDING):  apixaban 2.5 milliGRAM(s) Oral two times a day  chlorhexidine 4% Liquid 1 Application(s) Topical <User Schedule>  diltiazem    Tablet 30 milliGRAM(s) Oral three times a day  metoprolol tartrate 50 milliGRAM(s) Oral four times a day  oxybutynin 5 milliGRAM(s) Oral two times a day    MEDICATIONS  (PRN):  acetaminophen   Tablet .. 650 milliGRAM(s) Oral every 6 hours PRN Temp greater or equal to 38C (100.4F)  
  CRISTHIANGABRIELE  89y  Female      Patient is a 89y old  Female who presents with a chief complaint of Atrial flutter, rash, increased urine frequency (08 Jan 2021 13:36)    REVIEW OF SYSTEMS:  CONSTITUTIONAL: No fever, weight loss, or fatigue  EYES: No eye pain, visual disturbances, or discharge  ENMT:  No difficulty hearing, tinnitus, vertigo; No sinus or throat pain  NECK: No pain or stiffness  BREASTS: No pain, masses, or nipple discharge  RESPIRATORY: No cough, wheezing, chills or hemoptysis; No shortness of breath  CARDIOVASCULAR: No chest pain, palpitations, dizziness, or leg swelling  GASTROINTESTINAL: No abdominal or epigastric pain. No nausea, vomiting, or hematemesis; No diarrhea or constipation. No melena or hematochezia.  GENITOURINARY: No dysuria, frequency, hematuria, or incontinence  NEUROLOGICAL: No headaches, memory loss, loss of strength, numbness, or tremors  SKIN: No itching, burning, rashes, or lesions   LYMPH NODES: No enlarged glands  ENDOCRINE: No heat or cold intolerance; No hair loss  MUSCULOSKELETAL: No joint pain or swelling; No muscle, back, or extremity pain  PSYCHIATRIC: No depression, anxiety, mood swings, or difficulty sleeping  HEME/LYMPH: No easy bruising, or bleeding gums  ALLERY AND IMMUNOLOGIC: No hives or eczema  FAMILY HISTORY:  No pertinent family history in first degree relatives      ICU Vital Signs Last 24 Hrs  T(C): 37.3 (08 Jan 2021 13:51), Max: 37.3 (08 Jan 2021 13:51)  T(F): 99.2 (08 Jan 2021 13:51), Max: 99.2 (08 Jan 2021 13:51)  HR: 95 (08 Jan 2021 13:51) (62 - 144)  BP: 140/98 (08 Jan 2021 13:51) (120/62 - 187/75)  RR: 18 (08 Jan 2021 13:51) (18 - 18)  SpO2: 95% (08 Jan 2021 09:25) (95% - 97%)    PHYSICAL EXAM:  GENERAL: NAD, well-groomed, appears stated age  HEAD:  Atraumatic, Normocephalic  NERVOUS SYSTEM:  Alert & Oriented X2, Good concentration  CHEST/LUNG: Clear to percussion bilaterally; No rales, rhonchi, wheezing, or rubs  HEART: Regular rate and rhythm; No murmurs, rubs, or gallops  ABDOMEN: Soft, Nontender, Nondistended; Bowel sounds present  SKIN: erythematous, pruritic rash, generalized    Consultant(s) Notes Reviewed:  [x ] YES  [ ] NO  Care Discussed with Consultants/Other Providers [ x] YES  [ ] NO    LABS:    RADIOLOGY & ADDITIONAL TESTS:    Imaging Personally Reviewed:  [ ] YES  [ ] NO  chlorhexidine 4% Liquid 1 Application(s) Topical <User Schedule>  diphenhydrAMINE 25 milliGRAM(s) Oral every 8 hours  enoxaparin Injectable 50 milliGRAM(s) SubCutaneous two times a day  lisinopril 40 milliGRAM(s) Oral daily  metoprolol tartrate 50 milliGRAM(s) Oral three times a day  oxybutynin 5 milliGRAM(s) Oral two times a day      HEALTH ISSUES - PROBLEM Dx:        
Discharge note    GABRIELE MORROW  89y Female    INTERVAL HPI/OVERNIGHT EVENTS:    NO complaints. Sitting comfortably in a chair for lunch.    T(F): 96.5 (21 @ 05:33), Max: 97 (21 @ 21:12)  HR: 58 (21 @ 05:33) (58 - 103)  BP: 167/116 (21 @ 05:33) (135/90 - 167/116)  RR: 18 (21 @ 05:33) (18 - 18)  SpO2: 95% (21 @ 07:55) (95% - 95%) on RA    I&O's Summary    2021 07:01  -  2021 07:00  --------------------------------------------------------  IN: 360 mL / OUT: 600 mL / NET: -240 mL    2021 07:01  -  2021 14:13  --------------------------------------------------------  IN: 50 mL / OUT: 150 mL / NET: -100 mL        Daily Weight in k (2021 05:33)      PHYSICAL EXAM:  GENERAL: NAD  HEAD:  Normocephalic  EYES:  conjunctiva and sclera clear  ENMT: Moist mucous membranes  NECK: Supple, No JVD  NERVOUS SYSTEM:  Alert, awake, Good concentration  CHEST/LUNG: Clear to percussion bilaterally  HEART: IRR  ABDOMEN: Soft, Nontender, Nondistended; Bowel sounds present  EXTREMITIES: No edema      Consultant(s) Notes Reviewed:  [x ] YES  [ ] NO  Care Discussed with Consultants/Other Providers [ x] YES  [ ] NO    MEDICATIONS  (STANDING):  apixaban 2.5 milliGRAM(s) Oral two times a day  chlorhexidine 4% Liquid 1 Application(s) Topical <User Schedule>  diltiazem    milliGRAM(s) Oral daily  furosemide    Tablet 20 milliGRAM(s) Oral daily  lisinopril 5 milliGRAM(s) Oral daily  metoprolol tartrate 50 milliGRAM(s) Oral two times a day  oxybutynin 5 milliGRAM(s) Oral two times a day    MEDICATIONS  (PRN):  acetaminophen   Tablet .. 650 milliGRAM(s) Oral every 6 hours PRN Temp greater or equal to 38C (100.4F)    EKG reviewed  Telemetry reviewed    LABS:                        12.1   9.04  )-----------( 217      ( 2021 06:00 )             35.4         139  |  104  |  18  ----------------------------<  98  3.5   |  23  |  0.8    Ca    9.0      2021 06:00  Mg     2.0         TPro  6.2  /  Alb  3.4<L>  /  TBili  0.8  /  DBili  x   /  AST  17  /  ALT  10  /  AlkPhos  130<H>                RADIOLOGY & ADDITIONAL TESTS:    Imaging or report Personally Reviewed:  [x ] YES  [ ] NO    < from: TTE Echo Complete w/o Contrast w/ Doppler (21 @ 14:52) >  Summary:   1. Left ventricular ejection fraction, by visual estimation, is 45 to 50%.   2. Mildly decreased global left ventricular systolic function.   3. Mildly decreased segmental left ventricular systolic function.   4. Apical septal segment and apex are abnormal as described above.   5. Moderately enlarged left atrium.   6. Moderately enlarged right atrium.   7. Mild thickening and calcification of the anterior mitral valve leaflet.   8. Moderate mitral annular calcification.   9.Moderate to severe mitral valve regurgitation.  10. By color the MR appeasr Mod to svere , By PISA and regurgitant volume it isonly moderate.  11. Moderate tricuspid regurgitation.  12. PSAP 35.  13. Mild aortic regurgitation.  14. Sclerotic aortic valve with decreased opening.  15. AV openig miuildly restricted without significant gradient.    < end of copied text >    < from: CT Chest No Cont (01.10.21 @ 10:55) >    IMPRESSION:    1. Cardiomegaly with small to moderate size bilateral pleural effusions. Small pericardial effusion.    2. Bilateral upper lobe peripheral predominant parenchymal groundglass consolidations, left greater than right. Findings are nonspecific and may reflect volume overload as well as infectious/inflammatory etiology.    3. Nondisplaced fractures anterior right 6th, 7th, 8th, 9th and 10th ribs of indeterminate chronicity.    < end of copied text >      Case discussed with residents          
patient feels well   has no chest pain   no sob       ROS otherwise neg     Vital Signs Last 24 Hrs  T(C): 36.2 (10 Sean 2021 04:46), Max: 36.8 (09 Jan 2021 12:37)  T(F): 97.2 (10 Sean 2021 04:46), Max: 98.2 (09 Jan 2021 12:37)  HR: 86 (10 Sean 2021 04:46) (84 - 133)  BP: 187/112 (10 Sean 2021 04:46) (140/90 - 187/112)  BP(mean): --  RR: 19 (10 Sean 2021 04:46) (18 - 19)  SpO2: 96% (10 Sean 2021 08:11) (96% - 96%)    PHYSICAL EXAM:  GENERAL: NAD,  Pulm: Clear to auscultation bilaterally; No wheeze  CV: IRRegular rate and rhythm; No murmurs, rubs, or gallops  GI: Soft, Nontender, Nondistended; Bowel sounds present  EXTREMITIES:  2+ Peripheral Pulses, No clubbing, cyanosis, or edema  PSYCH: AAOx3  NEUROLOGY: non-focal                          12.2   8.99  )-----------( 210      ( 10 Sean 2021 04:30 )             36.3     01-10    139  |  104  |  15  ----------------------------<  96  3.6   |  24  |  0.8    Ca    8.6      10 Sean 2021 04:30  Mg     1.9     01-10    TPro  5.9<L>  /  Alb  3.4<L>  /  TBili  1.1  /  DBili  x   /  AST  16  /  ALT  8   /  AlkPhos  134<H>  01-10    LIVER FUNCTIONS - ( 10 Sean 2021 04:30 )  Alb: 3.4 g/dL / Pro: 5.9 g/dL / ALK PHOS: 134 U/L / ALT: 8 U/L / AST: 16 U/L / GGT: x                   Culture - Blood (collected 08 Jan 2021 23:30)  Source: .Blood Blood  Preliminary Report (10 Sean 2021 07:02):    No growth to date.    Culture - Urine (collected 08 Jan 2021 20:11)  Source: .Urine Catheterized  Final Report (09 Jan 2021 22:17):    <10,000 CFU/mL Normal Urogenital Jenny    MEDICATIONS  (STANDING):  chlorhexidine 4% Liquid 1 Application(s) Topical <User Schedule>  enoxaparin Injectable 50 milliGRAM(s) SubCutaneous two times a day  lisinopril 40 milliGRAM(s) Oral daily  metoprolol tartrate 50 milliGRAM(s) Oral four times a day  oxybutynin 5 milliGRAM(s) Oral two times a day    MEDICATIONS  (PRN):  acetaminophen   Tablet .. 650 milliGRAM(s) Oral every 6 hours PRN Temp greater or equal to 38C (100.4F)  
      Subjective: pt feels well and denies cp nor sob.      MEDICATIONS  (STANDING):  apixaban 2.5 milliGRAM(s) Oral two times a day  chlorhexidine 4% Liquid 1 Application(s) Topical <User Schedule>  diltiazem    milliGRAM(s) Oral daily  furosemide    Tablet 20 milliGRAM(s) Oral daily  lisinopril 5 milliGRAM(s) Oral daily  metoprolol tartrate 50 milliGRAM(s) Oral two times a day  oxybutynin 5 milliGRAM(s) Oral two times a day    MEDICATIONS  (PRN):  acetaminophen   Tablet .. 650 milliGRAM(s) Oral every 6 hours PRN Temp greater or equal to 38C (100.4F)            Vital Signs Last 24 Hrs  T(C): 36.8 (12 Jan 2021 14:35), Max: 36.8 (12 Jan 2021 14:35)  T(F): 98.2 (12 Jan 2021 14:35), Max: 98.2 (12 Jan 2021 14:35)  HR: 108 (12 Jan 2021 14:35) (58 - 108)  BP: 154/90 (12 Jan 2021 14:35) (154/83 - 167/116)  BP(mean): --  RR: 18 (12 Jan 2021 14:35) (18 - 18)  SpO2: 95% (12 Jan 2021 07:55) (95% - 95%)             REVIEW OF SYSTEMS:  CONSTITUTIONAL: no fever, no chills, no diaphoresis  CARDIOLOGY: no chest pain, no SOB, no palpitation, no diaphoresis, no faint   RESPIRATORY: no dyspnea, no wheeze, no orthopnea, no PND   NEUROLOGICAL: no dizziness, headache,  GI: no abdominal pain, no dyspepsia, no nausea, no vomiting, no diarrhea.    HEENT: no congestion, no nasal bleeding  SKIN: (+) ecchymosis,             PHYSICAL EXAM:  · CONSTITUTIONAL: Looks stable, in no distress  . NECK: Supple, no JVD, no bruit on either carotid side   · RESPIRATORY: Normal air entry to lung base, no wheeze, no crackle, no wet rales  · CARDIOVASCULAR: Normal S1, A2, P2, no murmur, no click, regular rate,  no rub,  · EXTREMITIES: No cyanosis, no clubbing, no edema  · VASCULAR: Pulses are regular, equal, bilateral in upper and lower extremities  	  TELEMETRY:afib mod v response          LABS:                        12.1   9.04  )-----------( 217      ( 12 Jan 2021 06:00 )             35.4     01-12    139  |  104  |  18  ----------------------------<  98  3.5   |  23  |  0.8    Ca    9.0      12 Jan 2021 06:00  Mg     2.0     01-12    TPro  6.2  /  Alb  3.4<L>  /  TBili  0.8  /  DBili  x   /  AST  17  /  ALT  10  /  AlkPhos  130<H>  01-12            I&O's Summary    11 Jan 2021 07:01  -  12 Jan 2021 07:00  --------------------------------------------------------  IN: 360 mL / OUT: 600 mL / NET: -240 mL    12 Jan 2021 07:01  -  12 Jan 2021 21:42  --------------------------------------------------------  IN: 150 mL / OUT: 150 mL / NET: 0 mL      BNP  RADIOLOGY & ADDITIONAL STUDIES:    IMPRESSION AND PLAN:

## 2022-05-11 NOTE — CONSULT NOTE ADULT - SUBJECTIVE AND OBJECTIVE BOX
HPI:  Patient is a poor historian , spoke to patient's friend (whom patient addresses as : Mynor Macias: [phone]) History couldnt be obtained as patient's friend Mynor is a poor historian , hard of hearing .    89 y/o female h/o HTN, urinary incontinence, dementia (baseline around AAx2), lives at home with friend who is also in 90s, no other assistance, now presents with unable to care for self at home and pruritic rash to body x months, worse over approx 1 week, no other household members with similar complaints, denies new medications, fever,  environmental exposures, recent travel, denies other complaints at present. Patient friend reports that she had labwork done as she recently had increased urinary frequency as well. Patient denies any pain but points to her rash over both extremities .     ED Course: Vitals; BP: 146/106, HR: 88, RR: 18, Temp: 98.3F  Labs: unremarkable, COVID negative, CXR : new left apical opacity, UA: negative   (2021 18:59)      PAST MEDICAL & SURGICAL HISTORY:  Urinary incontinence    Dementia    HTN (hypertension)    No significant past surgical history        Hospital Course: dx w aflutter, 2d echo also showed systolic dysfunction, seen by Derm for rash rash     TODAY'S SUBJECTIVE & REVIEW OF SYMPTOMS: + rash, confused, urinary frequency         MEDICATIONS  (STANDING):  chlorhexidine 4% Liquid 1 Application(s) Topical <User Schedule>  diphenhydrAMINE 25 milliGRAM(s) Oral every 8 hours  enoxaparin Injectable 50 milliGRAM(s) SubCutaneous two times a day  lisinopril 40 milliGRAM(s) Oral daily  metoprolol tartrate 50 milliGRAM(s) Oral three times a day  oxybutynin 5 milliGRAM(s) Oral two times a day    MEDICATIONS  (PRN):      FAMILY HISTORY:  No pertinent family history in first degree relatives        Allergies    No Known Allergies    Intolerances        SOCIAL HISTORY: see CM notes, pt confused,   [  ] Etoh  [  ] Smoking  [  ] Substance abuse     Home Environment:  [  ] Home Alone  [ x ] Lives with friend  [  ] Home Health Aid    Dwelling:  [  ] Apartment  [  ] Private House  [  ] Adult Home  [  ] Skilled Nursing Facility      [  ] Short Term  [  ] Long Term  [  ] Stairs       Elevator [  ]    FUNCTIONAL STATUS PTA: (Check all that apply) see CM notes  Ambulation: [   ]Independent   [   ] Requires Assistance   [  ] Dependent     [  ] Non-Ambulatory       Assistive Device: [  ] SA Cane  [  ]  Q Cane  [  ] Walker  [  ]  Wheelchair  ADL : [  ] Independent    [   ] Requires Assistance    [  ]  Dependent       Vital Signs Last 24 Hrs  T(C): 37.3 (2021 13:51), Max: 37.3 (2021 13:51)  T(F): 99.2 (2021 13:51), Max: 99.2 (2021 13:51)  HR: 95 (2021 13:51) (62 - 144)  BP: 140/98 (2021 13:51) (120/62 - 187/75)  BP(mean): --  RR: 18 (2021 13:51) (18 - 18)  SpO2: 95% (2021 09:25) (95% - 97%)      PHYSICAL EXAM: Alert & Oriented X2  GENERAL: NAD, well-groomed, well-developed  HEAD:  Atraumatic, Normocephalic  EYES: EOMI, PERRL  NECK: Supple  CHEST/LUNG: Clear to auscultation  HEART: Regular rate and rhythm  ABDOMEN: Soft, Nontender, Nondistended  EXTREMITIES:  No clubbing, cyanosis, or edema  ROM:  [   ] WFL all extremities  [  ] Abnormal    NERVOUS SYSTEM:   Cranial Nerves 2-12: [x   ] intact  [  ] Abnormal   Motor Strength: [ x  ] WFL all extremities   [  ] Abnormal   Sensation: [ x  ] intact to light touch  [  ] Abnormal   Reflexes: [ x  ] Symmetric  [  ]  Abnormal     FUNCTIONAL STATUS:  Bed Mobility: [   ]Independent  [  ] Supervision  [x  ] Needs Assistance   [  ] N/A   Transfers: [   ] Independent  [  ] Supervision  [x  ] Needs Assistance  [  ]  N/A   Ambulation: [   ] Independent  [  ] Supervision  [x  ] Needs Assistance  [  ] N/A   ADL: [   ] Independent  [ x ] Requires Assistance  [  ] N/A       LABS:                        10.8   8.78  )-----------( 206      ( 2021 05:51 )             34.1         142  |  107  |  26<H>  ----------------------------<  102<H>  4.1   |  24  |  1.0    Ca    8.8      2021 05:51  Phos  3.6     -  Mg     2.1     -    TPro  5.7<L>  /  Alb  3.3<L>  /  TBili  0.7  /  DBili  x   /  AST  13  /  ALT  6   /  AlkPhos  129<H>      PT/INR - ( 2021 05:51 )   PT: 13.30 sec;   INR: 1.16 ratio         PTT - ( 2021 05:51 )  PTT:31.5 sec  Urinalysis Basic - ( 2021 11:44 )    Color: Colorless / Appearance: Clear / S.004 / pH: x  Gluc: x / Ketone: Negative  / Bili: Negative / Urobili: <2 mg/dL   Blood: x / Protein: Negative / Nitrite: Negative   Leuk Esterase: Negative / RBC: x / WBC x   Sq Epi: x / Non Sq Epi: x / Bacteria: x        RADIOLOGY & ADDITIONAL STUDIES:             HPI:  Patient is a poor historian , spoke to patient's friend (whom patient addresses as : Mynor Macias: [phone]) History couldnt be obtained as patient's friend Mynor is a poor historian , hard of hearing .    89 y/o female h/o HTN, urinary incontinence, dementia (baseline around AAx2), lives at home with friend who is also in 90s, no other assistance, now presents with unable to care for self at home and pruritic rash to body x months, worse over approx 1 week, no other household members with similar complaints, denies new medications, fever,  environmental exposures, recent travel, denies other complaints at present. Patient friend reports that she had labwork done as she recently had increased urinary frequency as well. Patient denies any pain but points to her rash over both extremities .     ED Course: Vitals; BP: 146/106, HR: 88, RR: 18, Temp: 98.3F  Labs: unremarkable, COVID negative, CXR : new left apical opacity, UA: negative   (2021 18:59)      PAST MEDICAL & SURGICAL HISTORY:  Urinary incontinence    Dementia    HTN (hypertension)    No significant past surgical history        Hospital Course: dx w aflutter, 2d echo also showed systolic dysfunction, seen by Derm for rash rash     TODAY'S SUBJECTIVE & REVIEW OF SYMPTOMS: + rash, confused, urinary frequency         MEDICATIONS  (STANDING):  chlorhexidine 4% Liquid 1 Application(s) Topical <User Schedule>  diphenhydrAMINE 25 milliGRAM(s) Oral every 8 hours  enoxaparin Injectable 50 milliGRAM(s) SubCutaneous two times a day  lisinopril 40 milliGRAM(s) Oral daily  metoprolol tartrate 50 milliGRAM(s) Oral three times a day  oxybutynin 5 milliGRAM(s) Oral two times a day    MEDICATIONS  (PRN):      FAMILY HISTORY:  No pertinent family history in first degree relatives        Allergies    No Known Allergies    Intolerances        SOCIAL HISTORY: see CM notes, pt confused,   [  ] Etoh  [  ] Smoking  [  ] Substance abuse     Home Environment:  [  ] Home Alone  [ x ] Lives with friend  [  ] Home Health Aid    Dwelling:  [  ] Apartment  [  ] Private House  [  ] Adult Home  [  ] Skilled Nursing Facility      [  ] Short Term  [  ] Long Term  [  ] Stairs       Elevator [  ]    FUNCTIONAL STATUS PTA: (Check all that apply) see CM notes  Ambulation: [   ]Independent   [   ] Requires Assistance   [  ] Dependent     [  ] Non-Ambulatory       Assistive Device: [  ] SA Cane  [  ]  Q Cane  [  ] Walker  [  ]  Wheelchair  ADL : [  ] Independent    [   ] Requires Assistance    [  ]  Dependent       Vital Signs Last 24 Hrs  T(C): 37.3 (2021 13:51), Max: 37.3 (2021 13:51)  T(F): 99.2 (2021 13:51), Max: 99.2 (2021 13:51)  HR: 95 (2021 13:51) (62 - 144)  BP: 140/98 (2021 13:51) (120/62 - 187/75)  BP(mean): --  RR: 18 (2021 13:51) (18 - 18)  SpO2: 95% (2021 09:25) (95% - 97%)      PHYSICAL EXAM: Alert & Oriented X2  GENERAL: NAD, well-groomed, well-developed  HEAD:  Atraumatic, Normocephalic  EYES: EOMI, PERRL  NECK: Supple  CHEST/LUNG: Clear to auscultation  HEART: Regular rate and rhythm  ABDOMEN: Soft, Nontender, Nondistended  EXTREMITIES:  No clubbing, cyanosis, or edema  ROM:  [ x  ] WFL all extremities  [  ] Abnormal  = indwelling  catheter  NERVOUS SYSTEM:   Cranial Nerves 2-12: [x   ] intact  [  ] Abnormal   Motor Strength: [ x  ] WFL all extremities   [  ] Abnormal   Sensation: [ x  ] intact to light touch  [  ] Abnormal   Reflexes: [ x  ] Symmetric  [  ]  Abnormal     FUNCTIONAL STATUS:  Bed Mobility: [   ]Independent  [  ] Supervision  [x  ] Needs Assistance   [  ] N/A   Transfers: [   ] Independent  [  ] Supervision  [x  ] Needs Assistance  [  ]  N/A   Ambulation: [   ] Independent  [  ] Supervision  [x  ] Needs Assistance  [  ] N/A   ADL: [   ] Independent  [ x ] Requires Assistance  [  ] N/A       LABS:                        10.8   8.78  )-----------( 206      ( 2021 05:51 )             34.1     01-08    142  |  107  |  26<H>  ----------------------------<  102<H>  4.1   |  24  |  1.0    Ca    8.8      2021 05:51  Phos  3.6     -  Mg     2.1     -    TPro  5.7<L>  /  Alb  3.3<L>  /  TBili  0.7  /  DBili  x   /  AST  13  /  ALT  6   /  AlkPhos  129<H>      PT/INR - ( 2021 05:51 )   PT: 13.30 sec;   INR: 1.16 ratio         PTT - ( 2021 05:51 )  PTT:31.5 sec  Urinalysis Basic - ( 2021 11:44 )    Color: Colorless / Appearance: Clear / S.004 / pH: x  Gluc: x / Ketone: Negative  / Bili: Negative / Urobili: <2 mg/dL   Blood: x / Protein: Negative / Nitrite: Negative   Leuk Esterase: Negative / RBC: x / WBC x   Sq Epi: x / Non Sq Epi: x / Bacteria: x        RADIOLOGY & ADDITIONAL STUDIES:             Family notified

## 2025-02-28 NOTE — PHYSICAL THERAPY INITIAL EVALUATION ADULT - ASR EQUIP NEEDS DISCH PT EVAL
Spoke to patient in regards to stress test on 3/3 @ 7:30. Provided detailed instructions below and patient verbalized understanding.      Location: 6th Floor OPP, Parking garage D on [facility]  Arrive 15min early for registration  No eating 1hr prior to test  No Caffeine 12hrs prior to test  Please wear comfortable clothes/shoes for exercise  
rolling walker (5 inch wheels)

## 2025-07-25 NOTE — ED ADULT NURSE NOTE - NSINTERVENTIONOPT_GEN_ALL_ED
[No Acute Distress] : no acute distress [Well Nourished] : well nourished [Well Developed] : well developed [Well-Appearing] : well-appearing [Normal Sclera/Conjunctiva] : normal sclera/conjunctiva [PERRL] : pupils equal round and reactive to light [EOMI] : extraocular movements intact [Normal Outer Ear/Nose] : the outer ears and nose were normal in appearance [Normal Oropharynx] : the oropharynx was normal [No JVD] : no jugular venous distention [No Lymphadenopathy] : no lymphadenopathy [Supple] : supple Enhanced Supervision/Hourly Rounding [Thyroid Normal, No Nodules] : the thyroid was normal and there were no nodules present [No Respiratory Distress] : no respiratory distress  [No Accessory Muscle Use] : no accessory muscle use [Clear to Auscultation] : lungs were clear to auscultation bilaterally [Normal Rate] : normal rate  [Regular Rhythm] : with a regular rhythm [Normal S1, S2] : normal S1 and S2 [No Murmur] : no murmur heard [No Carotid Bruits] : no carotid bruits [No Abdominal Bruit] : a ~M bruit was not heard ~T in the abdomen [No Varicosities] : no varicosities [Pedal Pulses Present] : the pedal pulses are present [No Edema] : there was no peripheral edema [No Palpable Aorta] : no palpable aorta [No Extremity Clubbing/Cyanosis] : no extremity clubbing/cyanosis [Soft] : abdomen soft [Non Tender] : non-tender [Non-distended] : non-distended [No Masses] : no abdominal mass palpated [No HSM] : no HSM [Normal Bowel Sounds] : normal bowel sounds [Normal Posterior Cervical Nodes] : no posterior cervical lymphadenopathy [Normal Anterior Cervical Nodes] : no anterior cervical lymphadenopathy [No CVA Tenderness] : no CVA  tenderness [No Spinal Tenderness] : no spinal tenderness [No Joint Swelling] : no joint swelling [Grossly Normal Strength/Tone] : grossly normal strength/tone [No Rash] : no rash [Coordination Grossly Intact] : coordination grossly intact [No Focal Deficits] : no focal deficits [Normal Gait] : normal gait [Deep Tendon Reflexes (DTR)] : deep tendon reflexes were 2+ and symmetric [Normal Affect] : the affect was normal [Normal Insight/Judgement] : insight and judgment were intact